# Patient Record
Sex: FEMALE | Race: WHITE | NOT HISPANIC OR LATINO | Employment: FULL TIME | URBAN - METROPOLITAN AREA
[De-identification: names, ages, dates, MRNs, and addresses within clinical notes are randomized per-mention and may not be internally consistent; named-entity substitution may affect disease eponyms.]

---

## 2017-02-14 ENCOUNTER — OFFICE VISIT - HEALTHEAST (OUTPATIENT)
Dept: FAMILY MEDICINE | Facility: CLINIC | Age: 42
End: 2017-02-14

## 2017-02-14 DIAGNOSIS — E66.3 OVERWEIGHT: ICD-10-CM

## 2017-02-14 ASSESSMENT — MIFFLIN-ST. JEOR: SCORE: 1251.66

## 2017-04-28 ENCOUNTER — OFFICE VISIT - HEALTHEAST (OUTPATIENT)
Dept: FAMILY MEDICINE | Facility: CLINIC | Age: 42
End: 2017-04-28

## 2017-04-28 DIAGNOSIS — E66.3 OVERWEIGHT: ICD-10-CM

## 2017-04-28 ASSESSMENT — MIFFLIN-ST. JEOR: SCORE: 1267.09

## 2017-06-27 ENCOUNTER — TRANSFERRED RECORDS (OUTPATIENT)
Dept: HEALTH INFORMATION MANAGEMENT | Facility: CLINIC | Age: 42
End: 2017-06-27

## 2017-06-30 ENCOUNTER — TRANSFERRED RECORDS (OUTPATIENT)
Dept: HEALTH INFORMATION MANAGEMENT | Facility: CLINIC | Age: 42
End: 2017-06-30

## 2017-07-06 ENCOUNTER — COMMUNICATION - HEALTHEAST (OUTPATIENT)
Dept: FAMILY MEDICINE | Facility: CLINIC | Age: 42
End: 2017-07-06

## 2017-07-06 ENCOUNTER — RECORDS - HEALTHEAST (OUTPATIENT)
Dept: ADMINISTRATIVE | Facility: OTHER | Age: 42
End: 2017-07-06

## 2017-07-06 DIAGNOSIS — E66.3 OVERWEIGHT: ICD-10-CM

## 2017-07-18 ENCOUNTER — HOSPITAL ENCOUNTER (OUTPATIENT)
Dept: NUCLEAR MEDICINE | Facility: HOSPITAL | Age: 42
Discharge: HOME | End: 2017-07-18
Attending: OTOLARYNGOLOGY

## 2017-07-18 ENCOUNTER — TRANSFERRED RECORDS (OUTPATIENT)
Dept: HEALTH INFORMATION MANAGEMENT | Facility: CLINIC | Age: 42
End: 2017-07-18

## 2017-07-18 DIAGNOSIS — H70.11 CHRONIC MASTOIDITIS, RIGHT EAR: ICD-10-CM

## 2017-08-07 ENCOUNTER — OFFICE VISIT - HEALTHEAST (OUTPATIENT)
Dept: FAMILY MEDICINE | Facility: CLINIC | Age: 42
End: 2017-08-07

## 2017-08-07 DIAGNOSIS — E66.3 OVERWEIGHT: ICD-10-CM

## 2017-08-07 DIAGNOSIS — K59.00 CONSTIPATION, UNSPECIFIED CONSTIPATION TYPE: ICD-10-CM

## 2017-08-07 ASSESSMENT — MIFFLIN-ST. JEOR: SCORE: 1265.27

## 2017-08-14 ENCOUNTER — PRE VISIT (OUTPATIENT)
Dept: OTOLARYNGOLOGY | Facility: CLINIC | Age: 42
End: 2017-08-14

## 2017-08-14 NOTE — TELEPHONE ENCOUNTER
1.  Date/reason for appt: 8/28/17 at 9 AM - Cholesteatoma   2.  Referring provider: [facility] Dr. Simone Torres  3.  Call to patient (Yes / No - short description): no, referred  4.  Previous care at:   [facility]

## 2017-08-14 NOTE — TELEPHONE ENCOUNTER
Records received from [facility], forwarded to clinic.   Office notes:  DOS 9/21/16, 1/17/17, 5/26/17, 6/27/17 with Dr. Simone Torres  DOS 6/27/17 with Aditya Claros  Radiology reports:  DOS 7/18/17 NM Bone Scan ([facility]) - img requested  Op/Procedure notes:  DOS 9/21/16 Op Note Right radical tympanomastoidectomy w/ ossicular chain reconstruction (Dr. Simone Torres) - at [facility]  Pathology reports:  DOS 6/27/17 Aerobic Bacterial Culture results  Other:   DOS 6/27/17 Audio    Missing/needed records:  Dr Donald records and op note from 2014

## 2017-08-16 NOTE — TELEPHONE ENCOUNTER
Addt'l records received from [facility], forwarded to clinic.   Office notes:  DOS 12/13/13 through 12/17/15 with Dr. Donald  DOS 1/19/16 through 11/29/16 with Dr. Simone Torres  DOS 4/25/14 & 10/2/14 (Audiulogy evals)  Radiology reports:  DOS 8/2/16 CT Temporal Bone @ [facility] (image requested)  Op/Procedure notes:  DOS 1/28/14 Right tympanomastoidectomy w/ ossicular reconstruction (Dr. Donald at [facility])  DOS 12/13/13 Myringotomy / aspiration / eustachian tube inflation (found within office note)  Pathology reports:  DOS 1/28/14 Right Ear ([facility] [phone])  Other:   DOS 1/19/16 Ear Culture  DOS 12/13/13, 4/23/14, 10/2/14, 6/30/16, 6/27/17

## 2017-08-18 DIAGNOSIS — H71.90 CHOLESTEATOMA: Primary | ICD-10-CM

## 2017-08-22 NOTE — TELEPHONE ENCOUNTER
Called and spoke with Maya at [facility]. Per Maya, imaging CD will be mailed tomorrow. Says their CT department took longer to interoffice a copy of the CD back to her.

## 2017-08-24 NOTE — TELEPHONE ENCOUNTER
Imaging CD (8/2/16 CT T Bone) received from [facility], dropped off CD with [facility] (cd is not diacom compliant therefore unable to upload to pacs but can view in regular computer).

## 2017-08-27 ENCOUNTER — HEALTH MAINTENANCE LETTER (OUTPATIENT)
Age: 42
End: 2017-08-27

## 2017-08-28 ENCOUNTER — OFFICE VISIT (OUTPATIENT)
Dept: OTOLARYNGOLOGY | Facility: CLINIC | Age: 42
End: 2017-08-28

## 2017-08-28 ENCOUNTER — OFFICE VISIT (OUTPATIENT)
Dept: AUDIOLOGY | Facility: CLINIC | Age: 42
End: 2017-08-28

## 2017-08-28 VITALS — HEIGHT: 63 IN | BODY MASS INDEX: 24.63 KG/M2 | WEIGHT: 139 LBS

## 2017-08-28 DIAGNOSIS — H74.8X9: Primary | ICD-10-CM

## 2017-08-28 DIAGNOSIS — H71.90 CHOLESTEATOMA: Primary | ICD-10-CM

## 2017-08-28 DIAGNOSIS — H90.71 MIXED CONDUCTIVE AND SENSORINEURAL HEARING LOSS OF RIGHT EAR WITH UNRESTRICTED HEARING OF LEFT EAR: ICD-10-CM

## 2017-08-28 RX ORDER — POLYETHYLENE GLYCOL 3350 17 G/17G
1 POWDER, FOR SOLUTION ORAL DAILY
COMMUNITY
End: 2021-10-28

## 2017-08-28 ASSESSMENT — PAIN SCALES - GENERAL: PAINLEVEL: NO PAIN (0)

## 2017-08-28 NOTE — PROGRESS NOTES
AUDIOLOGY REPORT    SUMMARY: Audiology visit completed. See audiogram for results.      RECOMMENDATIONS: Follow-up with ENT.    Berta Whiteside  Licensed Audiologist  [place] License #[id number]

## 2017-08-28 NOTE — MR AVS SNAPSHOT
After Visit Summary   8/28/2017    Aicha Batista    MRN: [id number]           Patient Information     Date Of Birth          1975        Visit Information        Provider Department      8/28/2017 9:00 AM Ana Gonsalez MD M [facility] Ear Nose and Throat        Today's Diagnoses     Otorrhea    -  1      Care Instructions    You will need  to schedule a follow up appointment in 3 weeks for an ear check  You will be called with the results of the ear culture.     Patient to call the ENT clinic with further questions or concerns:   ENT Triage Nurse  [phone] option 3  ENT appointment scheduling [phone] option 1  ENT surgery scheduling, [place] [phone]  ENT Nurse Korin [phone]   Please call our clinic for any questions,concerns,or worsening symptoms.      Clinic #[phone]       Option 3  for the triage nurse.          Follow-ups after your visit        Your next 10 appointments already scheduled     Sep 20, 2017  7:00 AM CDT   (Arrive by 6:45 AM)   RETURN NEUROTOLOGY with MD JAIMSON Trejo [facility] Ear Nose and Throat ([facility] [place])    [address] [place] [id number] [phone]              Who to contact     Please call your clinic at [phone] to:    Ask questions about your health    Make or cancel appointments    Discuss your medicines    Learn about your test results    Speak to your doctor   If you have compliments or concerns about an experience at your clinic, or if you wish to file a complaint, please contact [facility] Patient Relations at [phone] or email us at [email or web address]         Additional Information About Your Visit        MyChart Information     [employer] gives you secure access to your electronic health record. If you see a primary care provider, you can also send messages to your care team and make appointments. If you have questions, please call your  "primary care clinic.  If you do not have a primary care provider, please call [phone] and they will assist you.      [employer] is an electronic gateway that provides easy, online access to your medical records. With [employer], you can request a clinic appointment, read your test results, renew a prescription or communicate with your care team.     To access your existing account, please contact your [facility] or call [phone] for assistance.        Care EveryWhere ID     This is your Care EveryWhere ID. This could be used by other organizations to access your [facility] medical records  [id number]        Your Vitals Were     Height BMI (Body Mass Index)                1.59 m (5' 2.6\") 24.94 kg/m2           Blood Pressure from Last 3 Encounters:   07/23/07 110/78   12/06/05 100/62    Weight from Last 3 Encounters:   08/28/17 63 kg (139 lb)   07/23/07 64.4 kg (142 lb)   12/06/05 62.2 kg (137 lb 3.2 oz)              We Performed the Following     Ear Culture Aerobic Bacterial     Fungus Culture, non-blood          Today's Medication Changes          These changes are accurate as of: 8/28/17 10:44 AM.  If you have any questions, ask your nurse or doctor.               Stop taking these medicines if you haven't already. Please contact your care team if you have questions.     NO ACTIVE MEDICATIONS   Stopped by:  Ana Gonsalez MD                    Primary Care Provider    Physician No Ref-Primary       No address on file        Equal Access to Services     FIDE [facility] CenterTIP : Hadii med marcelinoo Sojoeali, waaxda luqadaha, qaybta kaalmada adeegyada, анна rubio . So [facility] [phone].    ATENCIÓN: Si habla español, tiene a hernandes disposición servicios gratuitos de asistencia lingüística. Llame al [phone].    We comply with applicable federal civil rights laws and [place] laws. We do not discriminate on the basis of race, color, national origin, age, " disability sex, sexual orientation or gender identity.            Thank you!     Thank you for choosing [facility] EAR NOSE AND THROAT  for your care. Our goal is always to provide you with excellent care. Hearing back from our patients is one way we can continue to improve our services. Please take a few minutes to complete the written survey that you may receive in the mail after your visit with us. Thank you!             Your Updated Medication List - Protect others around you: Learn how to safely use, store and throw away your medicines at www.disposemymeds.org.          This list is accurate as of: 8/28/17 10:44 AM.  Always use your most recent med list.                   Brand Name Dispense Instructions for use Diagnosis    PHENTRIDE PO      Take 15 mg by mouth        polyethylene glycol Packet    MIRALAX/GLYCOLAX     Take 1 packet by mouth daily

## 2017-08-28 NOTE — PATIENT INSTRUCTIONS
You will need  to schedule a follow up appointment in 3 weeks for an ear check  You will be called with the results of the ear culture.     Patient to call the ENT clinic with further questions or concerns:   ENT Triage Nurse  [phone] option 3  ENT appointment scheduling [phone] option 1  ENT surgery scheduling, [place] [phone]  ENT Nurse Korin [phone]   Please call our clinic for any questions,concerns,or worsening symptoms.      Clinic #[phone]       Option 3  for the triage nurse.

## 2017-08-28 NOTE — LETTER
"8/28/2017       RE: Aicha Batista  [address] [facility] [address] [address]     Dear Colleague,    Thank you for referring your patient, Aicha Batista, to the [facility] EAR NOSE AND THROAT at [facility]. Please see a copy of my visit note below.    Aicha Batista is seen in consultation from Dr. Simone Torres.  She is a 41 year old female being seen for recurrent right otorrhea.  Patient reports that she first went to an ENT for pulsatile tinnitus and pressure when they found a cholesteatoma. Her first surgery was in 2014 and was a canal wall down, and while it cured her pulsatile tinnitus, she has been draining \"ever since.\" Reports she had a second surgery (radical mastoid), and has been draining after that too. She has tried drops, boric acid, gentian violet, vinegar rinses, fungal drops. She reports that she drains everyday, and it is usually straw colored output on her pillow in the am. She also has crusting on her ear and pooling in the conchal bowl. She also believes that her hearing loss on the left side is progressing. She denies otalgia or recurrent ear infections as a kid.        Past Medical History:   Diagnosis Date     Conductive hearing loss 1/28/2014    after 1st ear surgery (think its conductive??)       Past Surgical History:   Procedure Laterality Date     ENT SURGERY  1/28/2014    2nd surgery Sept 2016     HC COLP VAGINA W CERVIX IF PRES  1992    Cauterization of the cervix.       Family History   Problem Relation Age of Onset     Breast Cancer Mother      Thyroid Disease Mother      HEART DISEASE Father      Heart failure     Hypertension Father      CEREBROVASCULAR DISEASE Father      Cancer - colorectal Maternal Grandmother      Eye Disorder Maternal Grandmother      Glucoma     DIABETES Maternal Grandfather      HEART DISEASE Paternal Grandfather      Heart failure       Social History   Substance Use Topics     Smoking status: Former Smoker     Packs/day: 0.50 "     Years: 14.00     Types: Cigarettes     Quit date: 10/20/2007     Smokeless tobacco: Never Used      Comment: Only one to two per day.     Alcohol use Yes      Comment: occasional       Patient Supplied Answers to Review of Systems  [facility] ROS 8/24/2017   Ears, Nose, Throat Hearing loss, Ringing/noise in ears   The remainder of the 10 point review of systems is otherwise negative.    Physical examination:  Constitutional:  In no acute distress, appears stated age  Eyes:  Extraocular movements intact, no spontaneous nystagmus  Ears:  Both ears examined under the microscope.  The right ear shows a small mastoid cavity. There is granulation tissue and purulent drainage covering the TM and floor of the canal. The TM is thickened. The drainage is cultured. Left ear has normal, healthy EAC and TM. Sun lateralizes right. Rinne on right is bone>air, and left shows air>bone.  Respiratory:  No increased work of breathing, wheezing or stridor  Musculoskeletal:  Good upper extremity strength  Skin:  No rashes on the head and neck  Neurologic:  House Brackman 1/6 bilaterally, ambulating normally  Psychiatric:  Alert, normal affect, answering questions appropriately    Audiogram:  Today's audiogram shows normal left-sided hearing. The right side has moderate rising to normal, down sloping to mild hearing loss, 30-50dB air-bone gaps present at most frequencies. Her left tymp is normal, right is not (as would be expected)    Assessment and plan:  This is a 40 yo female with otorrhea for years after a CWD mastoidectomy for cholesteatoma. Since she has been draining for this long, we will give 6 weeks of oral antibiotics pending the cultures. She has had a bone scan already, and that was negative for osteomyelitis, however she may still need IV antibiotics to clear the infection. Although canal overclosure would fix her otorrhea, her hearing is quite good so we will see how she does on a prolonged course of antibiotics. We will  call her with the results of the culture and have her follow up in 3 weeks for recheck.     I, Ana Gonsalez, saw this patient with the resident/fellow and agree with the resident s findings and plan of care as documented in the resident s/fellow s note. I was present for the entire procedure.    Ana Gonsalez MD

## 2017-08-28 NOTE — PROGRESS NOTES
"Aicha Batista is seen in consultation from Dr. Simone Torres.  She is a 41 year old female being seen for recurrent right otorrhea.  Patient reports that she first went to an ENT for pulsatile tinnitus and pressure when they found a cholesteatoma. Her first surgery was in 2014 and was a canal wall down, and while it cured her pulsatile tinnitus, she has been draining \"ever since.\" Reports she had a second surgery (radical mastoid), and has been draining after that too. She has tried drops, boric acid, gentian violet, vinegar rinses, fungal drops. She reports that she drains everyday, and it is usually straw colored output on her pillow in the am. She also has crusting on her ear and pooling in the conchal bowl. She also believes that her hearing loss on the left side is progressing. She denies otalgia or recurrent ear infections as a kid.        Past Medical History:   Diagnosis Date     Conductive hearing loss 1/28/2014    after 1st ear surgery (think its conductive??)       Past Surgical History:   Procedure Laterality Date     ENT SURGERY  1/28/2014    2nd surgery Sept 2016     HC COLP VAGINA W CERVIX IF PRES  1992    Cauterization of the cervix.       Family History   Problem Relation Age of Onset     Breast Cancer Mother      Thyroid Disease Mother      HEART DISEASE Father      Heart failure     Hypertension Father      CEREBROVASCULAR DISEASE Father      Cancer - colorectal Maternal Grandmother      Eye Disorder Maternal Grandmother      Glucoma     DIABETES Maternal Grandfather      HEART DISEASE Paternal Grandfather      Heart failure       Social History   Substance Use Topics     Smoking status: Former Smoker     Packs/day: 0.50     Years: 14.00     Types: Cigarettes     Quit date: 10/20/2007     Smokeless tobacco: Never Used      Comment: Only one to two per day.     Alcohol use Yes      Comment: occasional       Patient Supplied Answers to Review of Systems   ENT ROS 8/24/2017   Ears, Nose, Throat " Hearing loss, Ringing/noise in ears   The remainder of the 10 point review of systems is otherwise negative.    Physical examination:  Constitutional:  In no acute distress, appears stated age  Eyes:  Extraocular movements intact, no spontaneous nystagmus  Ears:  Both ears examined under the microscope.  The right ear shows a small mastoid cavity. There is granulation tissue and purulent drainage covering the TM and floor of the canal. The TM is thickened. The drainage is cultured. Left ear has normal, healthy EAC and TM. Sun lateralizes right. Rinne on right is bone>air, and left shows air>bone.  Respiratory:  No increased work of breathing, wheezing or stridor  Musculoskeletal:  Good upper extremity strength  Skin:  No rashes on the head and neck  Neurologic:  House Brackman 1/6 bilaterally, ambulating normally  Psychiatric:  Alert, normal affect, answering questions appropriately    Audiogram:  Today's audiogram shows normal left-sided hearing. The right side has moderate rising to normal, down sloping to mild hearing loss, 30-50dB air-bone gaps present at most frequencies. Her left tymp is normal, right is not (as would be expected)    Assessment and plan:  This is a 42 yo female with otorrhea for years after a CWD mastoidectomy for cholesteatoma. Since she has been draining for this long, we will give 6 weeks of oral antibiotics pending the cultures. She has had a bone scan already, and that was negative for osteomyelitis, however she may still need IV antibiotics to clear the infection. Although canal overclosure would fix her otorrhea, her hearing is quite good so we will see how she does on a prolonged course of antibiotics. We will call her with the results of the culture and have her follow up in 3 weeks for recheck.     IAna, saw this patient with the resident/fellow and agree with the resident s findings and plan of care as documented in the resident s/fellow s note. I was present for the  entire procedure.    Ana Gonsalez MD

## 2017-09-01 DIAGNOSIS — H92.10 OTORRHEA: Primary | ICD-10-CM

## 2017-09-01 LAB
BACTERIA SPEC CULT: ABNORMAL
BACTERIA SPEC CULT: ABNORMAL
SPECIMEN SOURCE: ABNORMAL

## 2017-09-01 RX ORDER — CIPROFLOXACIN 500 MG/1
500 TABLET, FILM COATED ORAL 2 TIMES DAILY
Qty: 84 TABLET | Refills: 0 | Status: SHIPPED | OUTPATIENT
Start: 2017-09-01 | End: 2017-10-13

## 2017-09-01 RX ORDER — OFLOXACIN 3 MG/ML
5 SOLUTION AURICULAR (OTIC) 2 TIMES DAILY
Qty: 15 ML | Refills: 1 | Status: SHIPPED | OUTPATIENT
Start: 2017-09-01 | End: 2017-10-13

## 2017-09-01 NOTE — NURSING NOTE
Hi.  She's got 2 strains of pseudomonas.  Oral cipro 500 bid x 6 weeks total as well as Floxin for that long.  I'd like to see her in a month.  Thanks.     Ana     Above message left on pt voice mail. Rx sent to pharmacy. Pt has RTC scheduled.  Korin Mayes RN  ENT Care Coordinator   Otology  [phone]  9/1/2017 10:56 AM

## 2017-09-20 ENCOUNTER — OFFICE VISIT (OUTPATIENT)
Dept: OTOLARYNGOLOGY | Facility: CLINIC | Age: 42
End: 2017-09-20

## 2017-09-20 DIAGNOSIS — H70.91 INFECTION OF MASTOID BOWL, RIGHT: Primary | ICD-10-CM

## 2017-09-20 ASSESSMENT — PAIN SCALES - GENERAL: PAINLEVEL: NO PAIN (0)

## 2017-09-20 NOTE — PROGRESS NOTES
Aicha Batista is seen for a right ear check.  She grew 2 strains of pseudomonas on culture and she is currently on oral ciprofloxacin as well as topical ofloxacin.  She says that are days that the ear seems pretty dry and others where the ear is still moist.  It doesn't drain out of the ear but she can feel moisture in her ear and it squishes when she presses on the ear.  No pain.    Physical examination:  female in no acute distress.  Alert and answering questions appropriately.  HB 1/6 bilaterally.  Right ear examined under the microscope.  Posterior cavity still with an area of granulation filling the posterior cavity.  The anterior cavity and TM itself is healthy with only one small area of mucosalization.  The granulation was debrided with a curette and there is a firm fibrous band under the granulation with 2 perforations within the band and the posterior mastoid cavity is underneath the band.  She tolerated this fairly well.  The granulation was also cultured.    Assessment and plan:  Improved appearance of the mastoid cavity but still with granulation posteriorly, the TM is much healthier in appearance.  She still has several weeks of antibiotics.  I'd like to see her again in 2 weeks for another debridement.  If this culture continues to grow pseudomonas, we discussed that next time I will try to remove the scar band to open up the mastoid cavity posteriorly to open up the area of likely infection.  She is open to this as she'd like to avoid having another surgery if possible.

## 2017-09-20 NOTE — LETTER
9/20/2017      RE: [facility]ista  [address] [facility] [address]       Aicha Batista is seen for a right ear check.  She grew 2 strains of pseudomonas on culture and she is currently on oral ciprofloxacin as well as topical ofloxacin.  She says that are days that the ear seems pretty dry and others where the ear is still moist.  It doesn't drain out of the ear but she can feel moisture in her ear and it squishes when she presses on the ear.  No pain.    Physical examination:  female in no acute distress.  Alert and answering questions appropriately.  HB 1/6 bilaterally.  Right ear examined under the microscope.  Posterior cavity still with an area of granulation filling the posterior cavity.  The anterior cavity and TM itself is healthy with only one small area of mucosalization.  The granulation was debrided with a curette and there is a firm fibrous band under the granulation with 2 perforations within the band and the posterior mastoid cavity is underneath the band.  She tolerated this fairly well.  The granulation was also cultured.    Assessment and plan:  Improved appearance of the mastoid cavity but still with granulation posteriorly, the TM is much healthier in appearance.  She still has several weeks of antibiotics.  I'd like to see her again in 2 weeks for another debridement.  If this culture continues to grow pseudomonas, we discussed that next time I will try to remove the scar band to open up the mastoid cavity posteriorly to open up the area of likely infection.  She is open to this as she'd like to avoid having another surgery if possible.      Ana Gonsalez MD

## 2017-09-20 NOTE — PATIENT INSTRUCTIONS
You will need  to schedule a follow up appointment in 2 weeks.  You will be called with the results of the ear culture.   Please call our clinic for any questions,concerns,or worsening symptoms.      Clinic #[phone]       Option 3  for the triage nurse.

## 2017-09-20 NOTE — MR AVS SNAPSHOT
After Visit Summary   9/20/2017    Aicha Batista    MRN: [id number]           Patient Information     Date Of Birth          1975        Visit Information        Provider Department      9/20/2017 7:00 AM Ana Gonsalez MD M [facility] Ear Nose and Throat        Today's Diagnoses     Infection of mastoid bowl, right    -  1      Care Instructions    You will need  to schedule a follow up appointment in 2 weeks.  You will be called with the results of the ear culture.   Please call our clinic for any questions,concerns,or worsening symptoms.      Clinic #[phone]       Option 3  for the triage nurse.          Follow-ups after your visit        Follow-up notes from your care team     Return in about 2 weeks (around 10/4/2017).      Your next 10 appointments already scheduled     Oct 09, 2017  1:30 PM CDT   Walk In From ENT with Maria Luisa Henson [facility] Audiology ([facility])    [address] [place] [address] [phone]            Oct 09, 2017  2:45 PM CDT   (Arrive by 2:30 PM)   RETURN NEUROTOLOGY with MD JAMISON Trejo [facility] Ear Nose and Throat ([facility])    [address] [place] [address] [phone]              Who to contact     Please call your clinic at [phone] to:    Ask questions about your health    Make or cancel appointments    Discuss your medicines    Learn about your test results    Speak to your doctor   If you have compliments or concerns about an experience at your clinic, or if you wish to file a complaint, please contact [facility] Physicians Patient Relations at [phone] or email us at [email or web address]         Additional Information About Your Visit        MyChart Information     [employer] gives you secure access to your electronic health record. If you see a primary care provider, you can also send messages to your  care team and make appointments. If you have questions, please call your primary care clinic.  If you do not have a primary care provider, please call [phone] and they will assist you.      [employer] is an electronic gateway that provides easy, online access to your medical records. With [employer], you can request a clinic appointment, read your test results, renew a prescription or communicate with your care team.     To access your existing account, please contact your [facility] or call [phone] for assistance.        Care EveryWhere ID     This is your Care EveryWhere ID. This could be used by other organizations to access your [place] medical records  [id number]         Blood Pressure from Last 3 Encounters:   07/23/07 110/78   12/06/05 100/62    Weight from Last 3 Encounters:   08/28/17 63 kg (139 lb)   07/23/07 64.4 kg (142 lb)   12/06/05 62.2 kg (137 lb 3.2 oz)              We Performed the Following     BINOCULAR MICROSCOPY     DEBRIDMENT MASTOID CAVITY, SIMPLE     Ear Culture Aerobic Bacterial     Fungus Culture, non-blood        Primary Care Provider    Physician No Ref-Primary       No address on file        Equal Access to Services     AUGUSTO ELLINGTON : Hadii med Thurman, wagriffinda jackie, qabecky yeboah, анна perera. So [facility] [phone].    ATENCIÓN: Si habla español, tiene a hernandes disposición servicios gratGallup Indian Medical Centeros de asistencia lingüística. Llame al [phone].    We comply with applicable federal civil rights laws and [place] laws. We do not discriminate on the basis of race, color, national origin, age, disability sex, sexual orientation or gender identity.            Thank you!     Thank you for choosing [facility] EAR NOSE AND THROAT  for your care. Our goal is always to provide you with excellent care. Hearing back from our patients is one way we can continue to improve our services. Please take a few minutes to  complete the written survey that you may receive in the mail after your visit with us. Thank you!             Your Updated Medication List - Protect others around you: Learn how to safely use, store and throw away your medicines at www.disposemymeds.org.          This list is accurate as of: 9/20/17  8:17 AM.  Always use your most recent med list.                   Brand Name Dispense Instructions for use Diagnosis    ciprofloxacin 500 MG tablet    CIPRO    84 tablet    Take 1 tablet (500 mg) by mouth 2 times daily    Otorrhea       ofloxacin 0.3 % otic solution    FLOXIN    15 mL    Place 5 drops into the right ear 2 times daily    Otorrhea       PHENTRIDE PO      Take 15 mg by mouth        polyethylene glycol Packet    MIRALAX/GLYCOLAX     Take 1 packet by mouth daily

## 2017-09-22 ENCOUNTER — OFFICE VISIT - HEALTHEAST (OUTPATIENT)
Dept: FAMILY MEDICINE | Facility: CLINIC | Age: 42
End: 2017-09-22

## 2017-09-22 DIAGNOSIS — E66.3 OVERWEIGHT: ICD-10-CM

## 2017-09-22 ASSESSMENT — MIFFLIN-ST. JEOR: SCORE: 1262.1

## 2017-09-24 LAB
BACTERIA SPEC CULT: ABNORMAL
BACTERIA SPEC CULT: ABNORMAL
SPECIMEN SOURCE: ABNORMAL

## 2017-09-25 ENCOUNTER — CARE COORDINATION (OUTPATIENT)
Dept: OTOLARYNGOLOGY | Facility: CLINIC | Age: 42
End: 2017-09-25

## 2017-09-25 LAB
FUNGUS SPEC CULT: NORMAL
SPECIMEN SOURCE: NORMAL

## 2017-09-25 NOTE — PROGRESS NOTES
Aicha Batista   None    Message  Received: Today       Ana Gonsalez MD Bergh, Korin CARRINGTON, RN                   Still with 2 strains of pseudomonas.  Would you continue her antibiotics until her next appointment?  I'm going to open up the scar band if she'll tolerate it in clinic so I'll need belluci scissors.  Thanks.     Ana   9-25-17 Above message left on pt voice mail- pt has enough antibiotics until appt.  --- Korin Mayes RN  ENT Care Coordinator   Otology  [phone]  9/25/2017 11:35 AM

## 2017-10-09 ENCOUNTER — OFFICE VISIT (OUTPATIENT)
Dept: OTOLARYNGOLOGY | Facility: CLINIC | Age: 42
End: 2017-10-09

## 2017-10-09 DIAGNOSIS — H91.90 HEARING LOSS: Primary | ICD-10-CM

## 2017-10-09 DIAGNOSIS — H70.91 INFECTION OF RIGHT MASTOID BOWL: Primary | ICD-10-CM

## 2017-10-09 ASSESSMENT — PAIN SCALES - GENERAL: PAINLEVEL: NO PAIN (0)

## 2017-10-09 NOTE — LETTER
10/9/2017      RE: Aicha Batista  [address] [facility] [address]       Neurotology Clinic- Return visit  10/09/17    Aicha Batista is seen for a right ear check. She was last seen 9/20/17 given ongoing right otorrhea. A culture was taken and grew 2 strains of pseudomonas and continued regimen of ciprofloxacin BID. She states her ear has improved significantly and hasn't been draining. She denies otalgia, changes in her hearing, dizziness, imbalance, facial weakness.     Physical examination:  female in no acute distress.  Alert and answering questions appropriately.  HB 1/6 bilaterally.  Right ear examined under the microscope.  Posterior cavity appears slightly improved without significant otorrhea but persistent granulation tissue. A culture swab sample was collected. A curette was used to debride the granulation tissue within the posterior mastoid bowl down to the fibrous tissue layer medially. Purulent material was seen under this granulation tissue. The rest of her right ear looks improved with no further mucosalization on the TM.    Assessment and plan:  Ms. Batista shows mild improvement in her right mastoid cavity but shows persistent granulation tissue in the same posterior area. Her last culture showed some resistance in one of the pseudomonas strain. We debrided the granulation tissue partially today. Given persistent ear infection, we decided to have our Infectious Diseases colleagues see her given concern for developing resistance. She agreed with the plan.     - Referral to ID  - Return to clinic in 2 weeks for monitoring and debridement.   - Continue ear drops. Follow up culture results, adjust regimen as needed.  - We recommend that she contact us sooner if there are any questions or concerns.     Katlin Collins MD  PGY2  OtoHNS    I, Ana Gonsalez, saw this patient with the resident/fellow and agree with the resident s findings and plan of care as documented in the  resident s/fellow s note. I was present for the entire procedure.    Ana Gonsalez MD

## 2017-10-09 NOTE — PROGRESS NOTES
Neurotology Clinic- Return visit  10/09/17    Aicha Batista is seen for a right ear check. She was last seen 9/20/17 given ongoing right otorrhea. A culture was taken and grew 2 strains of pseudomonas and continued regimen of ciprofloxacin BID. She states her ear has improved significantly and hasn't been draining. She denies otalgia, changes in her hearing, dizziness, imbalance, facial weakness.     Physical examination:  female in no acute distress.  Alert and answering questions appropriately.  HB 1/6 bilaterally.  Right ear examined under the microscope.  Posterior cavity appears slightly improved without significant otorrhea but persistent granulation tissue. A culture swab sample was collected. A curette was used to debride the granulation tissue within the posterior mastoid bowl down to the fibrous tissue layer medially. Purulent material was seen under this granulation tissue. The rest of her right ear looks improved with no further mucosalization on the TM.    Assessment and plan:  Ms. Batista shows mild improvement in her right mastoid cavity but shows persistent granulation tissue in the same posterior area. Her last culture showed some resistance in one of the pseudomonas strain. We debrided the granulation tissue partially today. Given persistent ear infection, we decided to have our Infectious Diseases colleagues see her given concern for developing resistance. She agreed with the plan.     - Referral to ID  - Return to clinic in 2 weeks for monitoring and debridement.   - Continue ear drops. Follow up culture results, adjust regimen as needed.  - We recommend that she contact us sooner if there are any questions or concerns.     Katlin Collins MD  PGY2  OtoHNS    I, Ana Gonsalez, saw this patient with the resident/fellow and agree with the resident s findings and plan of care as documented in the resident s/fellow s note. I was present for the entire procedure.    Ana Gonsalez MD

## 2017-10-09 NOTE — PATIENT INSTRUCTIONS
You will need  to schedule a follow up appointment in   You will have a consult with Infectious disease physician  You will be called with the results of the ear culture.   Please call our clinic for any questions,concerns,or worsening symptoms.      Clinic #[phone]       Option 3  for the triage nurse.

## 2017-10-09 NOTE — MR AVS SNAPSHOT
After Visit Summary   10/9/2017    Aicha Batista    MRN: [id number]           Patient Information     Date Of Birth          1975        Visit Information        Provider Department      10/9/2017 2:45 PM Ana Gonsalez MD M [facility] Ear Nose and Throat        Today's Diagnoses     Otorrhea, right    -  1      Care Instructions    You will need  to schedule a follow up appointment in   You will have a consult with Infectious disease physician  You will be called with the results of the ear culture.   Please call our clinic for any questions,concerns,or worsening symptoms.      Clinic #[phone]       Option 3  for the triage nurse.          Follow-ups after your visit        Additional Services     INFECTIOUS DISEASE REFERRAL       Your provider has referred you to: [facility]: [facility] (Infectious Disease and HIV Clinic) [facility] [phone]   [email or web address]    EVALUATE AND TREAT- RIGHT OTORRHEA    Please be aware that coverage of these services is subject to the terms and limitations of your health insurance plan.  Call member services at your health plan with any benefit or coverage questions.      Please bring the following with you to your appointment:    (1) Any X-Rays, CTs or MRIs which have been performed.  Contact the facility where they were done to arrange for  prior to your scheduled appointment.    (2) List of current medications   (3) This referral request   (4) Any documents/labs given to you for this referral                  Your next 10 appointments already scheduled     Oct 23, 2017  8:30 AM CDT   (Arrive by 8:15 AM)   RETURN NEUROTOLOGY with MD JAMISON Trejo [facility] Ear Nose and Throat ([facility])    [address] [place] [address]   [phone]            Nov 03, 2017  1:00 PM CDT   (Arrive by 12:45 PM)   New Patient Visit with Angela Blake  MD [place]   [facility] and Infectious Diseases ([facility])    [address]  3rd [address]  [facility] [address] [phone]              Future tests that were ordered for you today     Open Future Orders        Priority Expected Expires Ordered    AUDIOLOGY ADULT REFERRAL Routine  4/7/2018 10/9/2017            Who to contact     Please call your clinic at [phone] to:    Ask questions about your health    Make or cancel appointments    Discuss your medicines    Learn about your test results    Speak to your doctor   If you have compliments or concerns about an experience at your clinic, or if you wish to file a complaint, please contact [facility] Physicians Patient Relations at [phone] or email us at [email or web address]         Additional Information About Your Visit        indicohart Information     [employer] gives you secure access to your electronic health record. If you see a primary care provider, you can also send messages to your care team and make appointments. If you have questions, please call your primary care clinic.  If you do not have a primary care provider, please call [phone] and they will assist you.      [employer] is an electronic gateway that provides easy, online access to your medical records. With [employer], you can request a clinic appointment, read your test results, renew a prescription or communicate with your care team.     To access your existing account, please contact your [facility] Clinic or call [phone] for assistance.        Care EveryWhere ID     This is your Care EveryWhere ID. This could be used by other organizations to access your [place] medical records  [id number]         Blood Pressure from Last 3 Encounters:   07/23/07 110/78   12/06/05 100/62    Weight from Last 3 Encounters:   08/28/17 63 kg (139 lb)   07/23/07 64.4 kg (142 lb)   12/06/05 62.2 kg (137  lb 3.2 oz)              We Performed the Following     Ear Culture Aerobic Bacterial     Fungus Culture, non-blood     INFECTIOUS DISEASE REFERRAL        Primary Care Provider Office Phone # Fax #    Jg Reynoso [phone]       [facility] [address]  [facility] [address]        Equal Access to Services     AUGUSTO ELLINGTON : Hadii aad ku hadasho Soomaali, waaxda luqadaha, qaybta kaalmada adeegyada, waxay idiin hayaan adeeg khkikrsh la'tristinn ah. So [facility] [phone].    ATENCIÓN: Si habla español, tiene a hernandes disposición servicios gratuitos de asistencia lingüística. Llame al [phone].    We comply with applicable federal civil rights laws and [place] laws. We do not discriminate on the basis of race, color, national origin, age, disability, sex, sexual orientation, or gender identity.            Thank you!     Thank you for choosing [facility] EAR NOSE AND THROAT  for your care. Our goal is always to provide you with excellent care. Hearing back from our patients is one way we can continue to improve our services. Please take a few minutes to complete the written survey that you may receive in the mail after your visit with us. Thank you!             Your Updated Medication List - Protect others around you: Learn how to safely use, store and throw away your medicines at www.disposemymeds.org.          This list is accurate as of: 10/9/17  3:09 PM.  Always use your most recent med list.                   Brand Name Dispense Instructions for use Diagnosis    ciprofloxacin 500 MG tablet    CIPRO    84 tablet    Take 1 tablet (500 mg) by mouth 2 times daily    Otorrhea       ofloxacin 0.3 % otic solution    FLOXIN    15 mL    Place 5 drops into the right ear 2 times daily    Otorrhea       PHENTRIDE PO      Take 15 mg by mouth        polyethylene glycol Packet    MIRALAX/GLYCOLAX     Take 1 packet by mouth daily

## 2017-10-09 NOTE — NURSING NOTE
Chief Complaint   Patient presents with     RECHECK     2 week follow up     Rasheed Pollock LPN

## 2017-10-09 NOTE — Clinical Note
10/9/2017       RE: Aicha Batista  [address] [facility] [address]     Dear Colleague,    Thank you for referring your patient, Aicha Batista, to the [facility] EAR NOSE AND TH[facility]AT at [facility]. Please see a copy of my visit note below.    Neurotology Clinic- Return visit  10/09/17    Aicha Batista is seen for a right ear check. She was last seen 9/20/17 given ongoing right otorrhea. A culture was taken and grew 2 strains of pseudomonas and continued regimen of ciprofloxacin BID. She states her ear has improved significantly and hasn't been draining. She denies otalgia, changes in her hearing, dizziness, imbalance, facial weakness.       Physical examination:  female in no acute distress.  Alert and answering questions appropriately.  HB 1/6 bilaterally.  Right ear examined under the microscope.  Posterior cavity appears slightly  improved without significant swelling but persistent pink granulation tissue with moist covering. A culture swab sample was collected. A curette was used to debride the granulation tissue sitting along the posterior mastoid bowl. Purulent material was seen under this granulation tissue. The rest of her right ear looks improved.     Assessment and plan:  Ms. Batista shows mild improvement in her right mastoid cavity but shows persistent granulation tissue in the same posterior area. Her last culture showed some resistance in one of the pseudomonas strain. We debrided the granulation tissue partially today. Given persistent ear infection, we decided to have our Infectious Diseases colleagues see her given concern for developing resistance. She agreed with the plan.     - Referral to ID  - Return to clinic in 2 weeks for monitoring and debridement.   - Continue ear drops. Follow up culture results, adjust regimen as needed.  - We recommend that she contact us sooner if there are any questions or concerns.     Katlin Collins MD  PGY2   OtoHNS        Again, thank you for allowing me to participate in the care of your patient.      Sincerely,    Ana Gonsalez MD

## 2017-10-11 LAB
BACTERIA SPEC CULT: NO GROWTH
SPECIMEN SOURCE: NORMAL

## 2017-10-13 DIAGNOSIS — H92.11 OTORRHEA, RIGHT: Primary | ICD-10-CM

## 2017-10-13 RX ORDER — CIPROFLOXACIN AND DEXAMETHASONE 3; 1 MG/ML; MG/ML
SUSPENSION/ DROPS AURICULAR (OTIC)
Qty: 7.5 ML | Refills: 0 | Status: SHIPPED | OUTPATIENT
Start: 2017-10-13 | End: 2017-10-23

## 2017-10-13 NOTE — NURSING NOTE
Dr. Gonsalez has reviewed the ear culture done 10-9-17, no growth. New Rx sent to pharmacy- due to clinical exam- ciprodex ear drops until rtc, keep the ID appt on 11-3-17 for now.  Above message left on pt voice mail--   Korin Mayes RN  ENT Care Coordinator   Otology  [phone]  10/13/2017 8:00 AM

## 2017-10-19 LAB
FUNGUS SPEC CULT: NORMAL
SPECIMEN SOURCE: NORMAL

## 2017-10-20 ENCOUNTER — TELEPHONE (OUTPATIENT)
Dept: INFECTIOUS DISEASES | Facility: CLINIC | Age: 42
End: 2017-10-20

## 2017-10-20 ENCOUNTER — MYC MEDICAL ADVICE (OUTPATIENT)
Dept: HEMATOLOGY | Facility: CLINIC | Age: 42
End: 2017-10-20

## 2017-10-20 NOTE — TELEPHONE ENCOUNTER
I called Aicha and left a message to inquire whether she could re-schedule her 11/3 appt from 1pm to either 9am or 10am. I instructed her to call back to let us know.    Angela Youngblood MD   of Medicine, Division of Infectious Diseases  [facility] [phone]

## 2017-10-23 ENCOUNTER — OFFICE VISIT (OUTPATIENT)
Dept: OTOLARYNGOLOGY | Facility: CLINIC | Age: 42
End: 2017-10-23

## 2017-10-23 DIAGNOSIS — H70.91 INFECTION OF RIGHT MASTOID BOWL: Primary | ICD-10-CM

## 2017-10-23 RX ORDER — CIPROFLOXACIN 500 MG/1
500 TABLET, FILM COATED ORAL 2 TIMES DAILY
Qty: 42 TABLET | Refills: 0 | Status: SHIPPED | OUTPATIENT
Start: 2017-10-23 | End: 2017-11-13

## 2017-10-23 ASSESSMENT — PAIN SCALES - GENERAL: PAINLEVEL: NO PAIN (0)

## 2017-10-23 NOTE — LETTER
10/23/2017      RE: [facility] Batista  [address] [facility] [facility] [address]       Aicha Batista is seen for a right mastoid cavity check.  Her last culture suprisingly was negative.  She reports her ear continues to be dry and feels fine.  She remains on drops but has been off her oral antibiotics for about a week.    Physical examination:  female in no acute distress.  Alert and answering questions appropriately.  HB 1/6 bilaterally.  Right ear examined under the microscope.  The posterior mastoid cavity has a very thin granulation lining that is no longer granular in appearance, there is a small amount of purulent in the superior posterior cavity and this area was cultured.  The thin layer of granulation was again removed from the surface of the cavity and suctioned away.  The anterior cavity and TM are healthy in appearance.    Assessment and plan:  Improving but still with a layer of granulation on the posterior cavity.  We will call with the culture results and I've put her back on ciprofloxacin orally.  She has an ID appointment next week but if the culture remains negative, we'll cancel that and continue to treat her with oral and topical antibiotics.      Ana Gonsalez MD

## 2017-10-23 NOTE — PROGRESS NOTES
Aicha Batista is seen for a right mastoid cavity check.  Her last culture suprisingly was negative.  She reports her ear continues to be dry and feels fine.  She remains on drops but has been off her oral antibiotics for about a week.    Physical examination:  female in no acute distress.  Alert and answering questions appropriately.  HB 1/6 bilaterally.  Right ear examined under the microscope.  The posterior mastoid cavity has a very thin granulation lining that is no longer granular in appearance, there is a small amount of purulent in the superior posterior cavity and this area was cultured.  The thin layer of granulation was again removed from the surface of the cavity and suctioned away.  The anterior cavity and TM are healthy in appearance.    Assessment and plan:  Improving but still with a layer of granulation on the posterior cavity.  We will call with the culture results and I've put her back on ciprofloxacin orally.  She has an ID appointment next week but if the culture remains negative, we'll cancel that and continue to treat her with oral and topical antibiotics.

## 2017-10-23 NOTE — PATIENT INSTRUCTIONS
You will need  to schedule a follow up appointment in 2 weeks.  You will be called with the results of the ear culture.  Please  the prescription at the pharmacy.   Please call our clinic for any questions,concerns,or worsening symptoms.      Clinic #[phone]       Option 3  for the triage nurse.

## 2017-10-25 ENCOUNTER — CARE COORDINATION (OUTPATIENT)
Dept: OTOLARYNGOLOGY | Facility: CLINIC | Age: 42
End: 2017-10-25

## 2017-10-25 NOTE — PROGRESS NOTES
Aicha Batista       Message  Received: Today       Ana Gonsalez MD Bergh, Korin CARRINGTON, RN                   Yep, as suspected, still growing pseudomonas.  Definitely see ID.  Thanks.     Ana   10-25-17 above message left on pt voice mail. ID appt in 11-3-17.    Korin Mayes RN  ENT Care Coordinator   Otology  [phone]  10/25/2017 3:42 PM

## 2017-10-26 ASSESSMENT — ENCOUNTER SYMPTOMS
TROUBLE SWALLOWING: 0
ABDOMINAL PAIN: 1
HEARTBURN: 0
VOMITING: 0
MUSCLE CRAMPS: 1
RECTAL BLEEDING: 0
RECTAL PAIN: 0
NAUSEA: 0
SMELL DISTURBANCE: 0
NECK MASS: 0
SINUS PAIN: 0
BACK PAIN: 1
STIFFNESS: 1
HOARSE VOICE: 0
SORE THROAT: 0
BLOOD IN STOOL: 0
MYALGIAS: 1
CONSTIPATION: 1
DIARRHEA: 0
BOWEL INCONTINENCE: 0
JAUNDICE: 0
SINUS CONGESTION: 0
TASTE DISTURBANCE: 0
ARTHRALGIAS: 1
NECK PAIN: 1
MUSCLE WEAKNESS: 0
JOINT SWELLING: 0
BLOATING: 1

## 2017-10-27 LAB
BACTERIA SPEC CULT: ABNORMAL
SPECIMEN SOURCE: ABNORMAL

## 2017-11-03 ENCOUNTER — OFFICE VISIT (OUTPATIENT)
Dept: INFECTIOUS DISEASES | Facility: CLINIC | Age: 42
End: 2017-11-03
Attending: INTERNAL MEDICINE
Payer: COMMERCIAL

## 2017-11-03 VITALS
DIASTOLIC BLOOD PRESSURE: 70 MMHG | RESPIRATION RATE: 18 BRPM | TEMPERATURE: 97.5 F | HEIGHT: 63 IN | HEART RATE: 70 BPM | BODY MASS INDEX: 25.55 KG/M2 | SYSTOLIC BLOOD PRESSURE: 122 MMHG | WEIGHT: 144.2 LBS

## 2017-11-03 DIAGNOSIS — H71.91 CHOLESTEATOMA OF RIGHT EAR: Primary | ICD-10-CM

## 2017-11-03 DIAGNOSIS — H70.91 INFECTION OF RIGHT MASTOID BOWL: ICD-10-CM

## 2017-11-03 PROCEDURE — 99212 OFFICE O/P EST SF 10 MIN: CPT | Mod: ZF

## 2017-11-03 ASSESSMENT — PAIN SCALES - GENERAL: PAINLEVEL: NO PAIN (0)

## 2017-11-03 NOTE — LETTER
11/3/2017       RE: Aich[facility] Batista  [facility] [facility] [address]     Dear Colleague,    Thank you for referring your patient, Aicha Batista, to the [facility] AND INFECTIOUS DISEASES at [facility]. Please see a copy of my visit note below.    ID Initial Patient Visit Note    Assessment:  -Chronic ear drainage likely related to cholesteatoma. I suspect she had an element of superficial Pseudomonas infection superimposed on this, but with frequent debridement and systemic + topical therapy this has resolved for now. I do suspect she may be at some risk for recurrent infection due to her altered anatomy.    Plan:  -For now I would stop oral cipro, since the Pseudomonas cultured most recently is resistant and she does not have a clinical indication for therapy with resolved drainage and no imaging to suggest bony infection. It seems reasonable to continue topical cipro, and if future endoscopic evaluations reveal no drainage could stop this agent, too, in the future.    If drainage recurs, she will likely need additional endoscopic debridement with cultures, though I would implicate Pseudomonas for future infections. At that point (ie, in the event of recurrent drainage) I would begin topical tobra-dex pending cultures; if symptoms are refractory to topical tobra-dex would trial a parenteral antibiotic.    She should follow-up with ID as needed.    Visit length 45 min, >50% clinical counseling/care coordination.    Angela Youngblood MD   of Medicine, Division of Infectious Diseases  [facility] [place] [phone]      HPI:  This is a pleasant 42 y/o woman presenting for chronic ear drainage. She initially reported pulsatile tinnitus in 2014 and underwent R CWD mastoidectomy with findings of opacified air cells and abnormal middle-ear soft tissue. Her tinnitus stopped but she had ear drainage a year after her surgical procedure prompting an evaluation that  ultimately rendered a diagnosis of recurrent cholesteatoma in 9/2016. When the drainage persisted despite non-operative interventions she underwent bone scan in 7/2017 that revealed no osteomyelitis. She was ultimately referred to [facility]. She has undergone serial endoscopies, with cultures growing P aeruginosa on 8/28 and 9/20. She notably received a prolonged (~4-6 weeks) course of oral cipro and cipro-dex drops. By her 10/9 endoscopy procedure, drainage had largely ceased and cultures were negative, though on 10/23 while active drainage was absence ear cultures grew P aeruginosa with the following susceptibilities:  PSEUDOMONAS AERUGINOSA      Antibiotic Interpretation Sensitivity Unit Method Status     AMIKACIN Sensitive <=16.0 ug/mL JOSÉ Final     CEFEPIME Sensitive <=2.0 ug/mL JOSÉ Final     CEFTAZIDIME Sensitive 2.0 ug/mL JOSÉ Final     CIPROFLOXACIN Resistant >2.0 ug/mL JOSÉ Final     GENTAMICIN Sensitive <=1.0 ug/mL JOSÉ Final     LEVOFLOXACIN Resistant >4.0 ug/mL JOSÉ Final     MEROPENEM Sensitive <=1.0 ug/mL JOSÉ Final     Piperacillin/Tazo Sensitive <=4.0 ug/mL JOSÉ Final     TOBRAMYCIN Sensitive <=1.0 ug/mL JOSÉ Final     She was placed back on ciprofloxacin orally. She reports the drainage has remained essentially quiet. No change in her hearing over the past few weeks. She continues to use cipro-dex drops.    Past Medical History:   Diagnosis Date     Conductive hearing loss 1/28/2014    after 1st ear surgery (think its conductive??)     Past Surgical History:   Procedure Laterality Date     ENT SURGERY  1/28/2014    2nd surgery Sept 2016     HC COLP VAGINA W CERVIX IF PRES  1992    Cauterization of the cervix.       Current Outpatient Prescriptions on File Prior to Visit:  ciprofloxacin (CIPRO) 500 MG tablet Take 1 tablet (500 mg) by mouth 2 times daily for 21 days   polyethylene glycol (MIRALAX/GLYCOLAX) Packet Take 1 packet by mouth daily   Phentermine HCl (PHENTRIDE PO) Take 15 mg by mouth     No current  "facility-administered medications on file prior to visit.     Family History   Problem Relation Age of Onset     Breast Cancer Mother      Thyroid Disease Mother      HEART DISEASE Father      Heart failure     Hypertension Father      CEREBROVASCULAR DISEASE Father      Cancer - colorectal Maternal Grandmother      Eye Disorder Maternal Grandmother      Glucoma     DIABETES Maternal Grandfather      HEART DISEASE Paternal Grandfather      Heart failure     Social History   Substance Use Topics     Smoking status: Former Smoker     Packs/day: 0.50     Years: 14.00     Types: Cigarettes     Quit date: 10/20/2007     Smokeless tobacco: Never Used      Comment: Only one to two per day.     Alcohol use Yes      Comment: occasional     Exam:  /70  Pulse 70  Temp 97.5  F (36.4  C) (Oral)  Resp 18  Ht 1.59 m (5' 2.6\")  Wt 65.4 kg (144 lb 3.2 oz)  BMI 25.87 kg/m2  Awake, alert, pleasant  Examination of R ear reveals a scarred TM, no drainage that I can appreciate. No chris-auricular tenderness. L ear normal  HR regular  Breathing normal, lungs clear  No skin rash    Sincerely,    Angela Youngblood MD  "

## 2017-11-03 NOTE — PATIENT INSTRUCTIONS
It was nice to meet you today!    Here are the things we discussed:  -I would advise stopping oral ciprofloxacin, but continuing cipro-dex (drops)  -When Dr. Gonsalez sees that your ear looks normal or almost normal, you could try stopping the cipro-dex drops  -If the drainage comes back, I would advocate trying tobra-dex drops, and if drainage continues despite tobra-dex drops I would advocate trying an IV antibiotic. This would require that you return to a clinic setting to have an IV line put in that you could have at home and manage with a nurse's help.

## 2017-11-03 NOTE — MR AVS SNAPSHOT
After Visit Summary   11/3/2017    Aicha Batista    MRN: [id number]           Patient Information     Date Of Birth          1975        Visit Information        Provider Department      11/3/2017 9:00 AM Angela Youngblood MD [facility] and Infectious Diseases        Today's Diagnoses     Infection of right mastoid bowl          Care Instructions    It was nice to meet you today!    Here are the things we discussed:  -I would advise stopping oral ciprofloxacin, but continuing cipro-dex (drops)  -When Dr. Gonsalez sees that your ear looks normal or almost normal, you could try stopping the cipro-dex drops  -If the drainage comes back, I would advocate trying tobra-dex drops, and if drainage continues despite tobra-dex drops I would advocate trying an IV antibiotic. This would require that you return to a clinic setting to have an IV line put in that you could have at home and manage with a nurse's help.          Follow-ups after your visit        Follow-up notes from your care team     Return if symptoms worsen or fail to improve.      Your next 10 appointments already scheduled     Nov 09, 2017  2:45 PM CST   (Arrive by 2:30 PM)   RETURN NEUROTOLOGY with Ana Gonsalez MD   [facility] Ear Nose and Throat ([facility])    [address] [place] [address]  [facility] [address] [phone]              Who to contact     If you have questions or need follow up information about today's clinic visit or your schedule please contact [facility] AND INFECTIOUS DISEASES directly at [phone].  Normal or non-critical lab and imaging results will be communicated to you by MyChart, letter or phone within 4 business days after the clinic has received the results. If you do not hear from us within 7 days, please contact the clinic through MyChart or phone. If you have a critical or abnormal lab result, we will notify you by phone as soon as  "possible.  Submit refill requests through [employer] or call your pharmacy and they will forward the refill request to us. Please allow 3 business days for your refill to be completed.          Additional Information About Your Visit        [employer] Information     [employer] gives you secure access to your electronic health record. If you see a primary care provider, you can also send messages to your care team and make appointments. If you have questions, please call your primary care clinic.  If you do not have a primary care provider, please call [phone] and they will assist you.        Care EveryWhere ID     This is your Care EveryWhere ID. This could be used by other organizations to access your [place] medical records  [id number]        Your Vitals Were     Pulse Temperature Respirations Height BMI (Body Mass Index)       70 97.5  F (36.4  C) (Oral) 18 1.59 m (5' 2.6\") 25.87 kg/m2        Blood Pressure from Last 3 Encounters:   11/03/17 122/70   07/23/07 110/78   12/06/05 100/62    Weight from Last 3 Encounters:   11/03/17 65.4 kg (144 lb 3.2 oz)   08/28/17 63 kg (139 lb)   07/23/07 64.4 kg (142 lb)              Today, you had the following     No orders found for display       Primary Care Provider Office Phone # Fax #    Jg Reynoso [phone]       [facility] [address]  [facility] [address]        Equal Access to Services     [facility]: Hadii med ku hadasho Sojoeali, waaxda luqadaha, qaybta kaalmada adecanyada, анна perera. So [facility] [phone].    ATENCIÓN: Si habla español, tiene a hernandes disposición servicios gratuitos de asistencia lingüística. Llame al [phone].    We comply with applicable federal civil rights laws and [place] laws. We do not discriminate on the basis of race, color, national origin, age, disability, sex, sexual orientation, or gender identity.            Thank you!     Thank you for choosing [facility] " [facility] AND INFECTIOUS DISEASES  for your care. Our goal is always to provide you with excellent care. Hearing back from our patients is one way we can continue to improve our services. Please take a few minutes to complete the written survey that you may receive in the mail after your visit with us. Thank you!             Your Updated Medication List - Protect others around you: Learn how to safely use, store and throw away your medicines at www.disposemymeds.org.          This list is accurate as of: 11/3/17 10:00 AM.  Always use your most recent med list.                   Brand Name Dispense Instructions for use Diagnosis    ciprofloxacin 500 MG tablet    CIPRO    42 tablet    Take 1 tablet (500 mg) by mouth 2 times daily for 21 days    Infection of right mastoid bowl       PHENTRIDE PO      Take 15 mg by mouth        polyethylene glycol Packet    MIRALAX/GLYCOLAX     Take 1 packet by mouth daily

## 2017-11-03 NOTE — NURSING NOTE
"Chief Complaint   Patient presents with     Consult     New otorrhea consult       Initial /70  Pulse 70  Temp 97.5  F (36.4  C) (Oral)  Resp 18  Ht 1.59 m (5' 2.6\")  Wt 65.4 kg (144 lb 3.2 oz)  BMI 25.87 kg/m2 Estimated body mass index is 25.87 kg/(m^2) as calculated from the following:    Height as of this encounter: 1.59 m (5' 2.6\").    Weight as of this encounter: 65.4 kg (144 lb 3.2 oz).  Medication Reconciliation: complete   BROOKLYN AKERS CMA      "

## 2017-11-03 NOTE — LETTER
11/3/2017      RE: Aicha Batista  [address] [facility] [address]       ID Initial Patient Visit Note    Assessment:  -Chronic ear drainage likely related to cholesteatoma. I suspect she had an element of superficial Pseudomonas infection superimposed on this, but with frequent debridement and systemic + topical therapy this has resolved for now. I do suspect she may be at some risk for recurrent infection due to her altered anatomy.    Plan:  -For now I would stop oral cipro, since the Pseudomonas cultured most recently is resistant and she does not have a clinical indication for therapy with resolved drainage and no imaging to suggest bony infection. It seems reasonable to continue topical cipro, and if future endoscopic evaluations reveal no drainage could stop this agent, too, in the future.    If drainage recurs, she will likely need additional endoscopic debridement with cultures, though I would implicate Pseudomonas for future infections. At that point (ie, in the event of recurrent drainage) I would begin topical tobra-dex pending cultures; if symptoms are refractory to topical tobra-dex would trial a parenteral antibiotic.    She should follow-up with ID as needed.    Visit length 45 min, >50% clinical counseling/care coordination.    Angela Youngblood MD   of Medicine, Division of Infectious Diseases  [facility] [phone]      HPI:  This is a pleasant 40 y/o woman presenting for chronic ear drainage. She initially reported pulsatile tinnitus in 2014 and underwent R CWD mastoidectomy with findings of opacified air cells and abnormal middle-ear soft tissue. Her tinnitus stopped but she had ear drainage a year after her surgical procedure prompting an evaluation that ultimately rendered a diagnosis of recurrent cholesteatoma in 9/2016. When the drainage persisted despite non-operative interventions she underwent bone scan in 7/2017 that revealed no osteomyelitis. She was ultimately referred  to [facility]. She has undergone serial endoscopies, with cultures growing P aeruginosa on 8/28 and 9/20. She notably received a prolonged (~4-6 weeks) course of oral cipro and cipro-dex drops. By her 10/9 endoscopy procedure, drainage had largely ceased and cultures were negative, though on 10/23 while active drainage was absence ear cultures grew P aeruginosa with the following susceptibilities:  PSEUDOMONAS AERUGINOSA      Antibiotic Interpretation Sensitivity Unit Method Status     AMIKACIN Sensitive <=16.0 ug/mL JOSÉ Final     CEFEPIME Sensitive <=2.0 ug/mL JOSÉ Final     CEFTAZIDIME Sensitive 2.0 ug/mL JOSÉ Final     CIPROFLOXACIN Resistant >2.0 ug/mL JOSÉ Final     GENTAMICIN Sensitive <=1.0 ug/mL JOSÉ Final     LEVOFLOXACIN Resistant >4.0 ug/mL JOSÉ Final     MEROPENEM Sensitive <=1.0 ug/mL JOSÉ Final     Piperacillin/Tazo Sensitive <=4.0 ug/mL JOSÉ Final     TOBRAMYCIN Sensitive <=1.0 ug/mL JOSÉ Final     She was placed back on ciprofloxacin orally. She reports the drainage has remained essentially quiet. No change in her hearing over the past few weeks. She continues to use cipro-dex drops.    Past Medical History:   Diagnosis Date     Conductive hearing loss 1/28/2014    after 1st ear surgery (think its conductive??)     Past Surgical History:   Procedure Laterality Date     ENT SURGERY  1/28/2014    2nd surgery Sept 2016     HC COLP VAGINA W CERVIX IF PRES  1992    Cauterization of the cervix.       Current Outpatient Prescriptions on File Prior to Visit:  ciprofloxacin (CIPRO) 500 MG tablet Take 1 tablet (500 mg) by mouth 2 times daily for 21 days   polyethylene glycol (MIRALAX/GLYCOLAX) Packet Take 1 packet by mouth daily   Phentermine HCl (PHENTRIDE PO) Take 15 mg by mouth     No current facility-administered medications on file prior to visit.     Family History   Problem Relation Age of Onset     Breast Cancer Mother      Thyroid Disease Mother      HEART DISEASE Father      Heart failure     Hypertension  "Father      CEREBROVASCULAR DISEASE Father      Cancer - colorectal Maternal Grandmother      Eye Disorder Maternal Grandmother      Glucoma     DIABETES Maternal Grandfather      HEART DISEASE Paternal Grandfather      Heart failure     Social History   Substance Use Topics     Smoking status: Former Smoker     Packs/day: 0.50     Years: 14.00     Types: Cigarettes     Quit date: 10/20/2007     Smokeless tobacco: Never Used      Comment: Only one to two per day.     Alcohol use Yes      Comment: occasional     Exam:  /70  Pulse 70  Temp 97.5  F (36.4  C) (Oral)  Resp 18  Ht 1.59 m (5' 2.6\")  Wt 65.4 kg (144 lb 3.2 oz)  BMI 25.87 kg/m2  Awake, alert, pleasant  Examination of R ear reveals a scarred TM, no drainage that I can appreciate. No chris-auricular tenderness. L ear normal  HR regular  Breathing normal, lungs clear  No skin rash      Angela Youngblood MD      "

## 2017-11-06 ENCOUNTER — COMMUNICATION - HEALTHEAST (OUTPATIENT)
Dept: FAMILY MEDICINE | Facility: CLINIC | Age: 42
End: 2017-11-06

## 2017-11-06 DIAGNOSIS — E66.3 OVERWEIGHT: ICD-10-CM

## 2017-11-06 LAB
FUNGUS SPEC CULT: NORMAL
SPECIMEN SOURCE: NORMAL

## 2017-11-09 ENCOUNTER — OFFICE VISIT (OUTPATIENT)
Dept: OTOLARYNGOLOGY | Facility: CLINIC | Age: 42
End: 2017-11-09

## 2017-11-09 VITALS — HEIGHT: 63 IN | BODY MASS INDEX: 25.52 KG/M2 | WEIGHT: 144 LBS

## 2017-11-09 DIAGNOSIS — H70.91 INFECTION OF RIGHT MASTOID BOWL: Primary | ICD-10-CM

## 2017-11-09 RX ORDER — CIPROFLOXACIN AND DEXAMETHASONE 3; 1 MG/ML; MG/ML
SUSPENSION/ DROPS AURICULAR (OTIC)
Qty: 7.5 ML | Refills: 0 | Status: SHIPPED | OUTPATIENT
Start: 2017-11-09 | End: 2017-11-19

## 2017-11-09 ASSESSMENT — PAIN SCALES - GENERAL: PAINLEVEL: NO PAIN (0)

## 2017-11-09 NOTE — LETTER
11/9/2017      RE: Aicha Batista  [address] [facility] [address]       Aicha Batista is seen for a right mastoid check.  She has not had any further otorrhea or pain.  She saw ID who recommended she stop oral ciprofloxacin but continue the Ciprodex drops.    Physical examination:  female in no acute distress.  Alert and answering questions appropriately.  HB 1/6 bilaterally.  Right ear examined under the microscope.  The TM and anterior portion of the cavity is healthy and stable.  The posterior portion of the cavity has no granulation tissue seen, no mucosalization, the floor is dry with neovascularization on the floor.    Assessment and plan:  This is the healthiest her cavity has been so far.  At this point, I recommended she stop the drops and continue dry ear precautions.  Hopefully her ear will remain healthy with no antibiotic therapy.  She will be seen in 2 weeks for another check.      Ana Gonsalez MD

## 2017-11-09 NOTE — PATIENT INSTRUCTIONS
You will need  to schedule a follow up appointment in 2 weeks.  Please  the prescription for ear drops.   Please call our clinic for any questions,concerns,or worsening symptoms.      Clinic #[phone]       Option 3  for the triage nurse.

## 2017-11-09 NOTE — MR AVS SNAPSHOT
After Visit Summary   11/9/2017    Aicha Batista    MRN: [id number]           Patient Information     Date Of Birth          1975        Visit Information        Provider Department      11/9/2017 2:45 PM Ana Gonsalez MD [facility] Ear Nose and Throat        Care Instructions    You will need  to schedule a follow up appointment in 2 weeks.  Please  the prescription for ear drops.   Please call our clinic for any questions,concerns,or worsening symptoms.      Clinic #[phone]       Option 3  for the triage nurse.          Follow-ups after your visit        Your next 10 appointments already scheduled     Nov 30, 2017  2:45 PM CST   (Arrive by 2:30 PM)   RETURN NEUROTOLOGY with MD JAMISON Trejo [facility] Ear Nose and Throat ([facility])    [address] [place] [address] [phone]              Who to contact     Please call your clinic at [phone] to:    Ask questions about your health    Make or cancel appointments    Discuss your medicines    Learn about your test results    Speak to your doctor   If you have compliments or concerns about an experience at your clinic, or if you wish to file a complaint, please contact [facility] Patient Relations at [phone] or email us at [email or web address]         Additional Information About Your Visit        MyChart Information     [employer] gives you secure access to your electronic health record. If you see a primary care provider, you can also send messages to your care team and make appointments. If you have questions, please call your primary care clinic.  If you do not have a primary care provider, please call [phone] and they will assist you.      [employer] is an electronic gateway that provides easy, online access to your medical records. With [employer], you can request a clinic appointment, read your test results, renew a prescription  "or communicate with your care team.     To access your existing account, please contact your [facility] or call [phone] for assistance.        Care EveryWhere ID     This is your Care EveryWhere ID. This could be used by other organizations to access your [place] medical records  [id number]        Your Vitals Were     Height BMI (Body Mass Index)                1.59 m (5' 2.6\") 25.84 kg/m2           Blood Pressure from Last 3 Encounters:   11/03/17 122/70   07/23/07 110/78   12/06/05 100/62    Weight from Last 3 Encounters:   11/09/17 65.3 kg (144 lb)   11/03/17 65.4 kg (144 lb 3.2 oz)   08/28/17 63 kg (139 lb)              Today, you had the following     No orders found for display       Primary Care Provider Office Phone # Fax #    Jg Reynoso [phone]       [facility] [address]  [facility] [address]        Equal Access to Services     [place]GUSTO ELLINGTON : Hadii aad ku hadasho Soomaali, waaxda luqadaha, qaybta kaalmada adeegyada, waxay idiin hayaan akiko rubio . So [facility] [phone].    ATENCIÓN: Si habla español, tiene a hernandes disposición servicios gratuitos de asistencia lingüística. Llame al [phone].    We comply with applicable federal civil rights laws and [place] laws. We do not discriminate on the basis of race, color, national origin, age, disability, sex, sexual orientation, or gender identity.            Thank you!     Thank you for choosing [facility] EAR NOSE AND THROAT  for your care. Our goal is always to provide you with excellent care. Hearing back from our patients is one way we can continue to improve our services. Please take a few minutes to complete the written survey that you may receive in the mail after your visit with us. Thank you!             Your Updated Medication List - Protect others around you: Learn how to safely use, store and throw away your medicines at www.disposemymeds.org.          This " list is accurate as of: 11/9/17  3:29 PM.  Always use your most recent med list.                   Brand Name Dispense Instructions for use Diagnosis    ciprofloxacin 500 MG tablet    CIPRO    42 tablet    Take 1 tablet (500 mg) by mouth 2 times daily for 21 days    Infection of right mastoid bowl       PHENTRIDE PO      Take 15 mg by mouth        polyethylene glycol Packet    MIRALAX/GLYCOLAX     Take 1 packet by mouth daily

## 2017-11-09 NOTE — NURSING NOTE
Chief Complaint   Patient presents with     RECHECK     2 week f/u     Faye Murguia Medical Assistant

## 2017-11-09 NOTE — Clinical Note
11/9/2017       RE: [facility]ich[facility] Batista  [address] [facility] [address]     Dear Colleague,    Thank you for referring your patient, Aicha Batista, to the [facility] EAR NOSE AND THROAT at [facility]. Please see a copy of my visit note below.    No notes on file    Again, thank you for allowing me to participate in the care of your patient.      Sincerely,    nAa Gonsalez MD

## 2017-11-13 NOTE — PROGRESS NOTES
Aicha Batista is seen for a right mastoid check.  She has not had any further otorrhea or pain.  She saw ID who recommended she stop oral ciprofloxacin but continue the Ciprodex drops.    Physical examination:  female in no acute distress.  Alert and answering questions appropriately.  HB 1/6 bilaterally.  Right ear examined under the microscope.  The TM and anterior portion of the cavity is healthy and stable.  The posterior portion of the cavity has no granulation tissue seen, no mucosalization, the floor is dry with neovascularization on the floor.    Assessment and plan:  This is the healthiest her cavity has been so far.  At this point, I recommended she stop the drops and continue dry ear precautions.  Hopefully her ear will remain healthy with no antibiotic therapy.  She will be seen in 2 weeks for another check.

## 2017-11-13 NOTE — PROGRESS NOTES
ID Initial Patient Visit Note    Assessment:  -Chronic ear drainage likely related to cholesteatoma. I suspect she had an element of superficial Pseudomonas infection superimposed on this, but with frequent debridement and systemic + topical therapy this has resolved for now. I do suspect she may be at some risk for recurrent infection due to her altered anatomy.    Plan:  -For now I would stop oral cipro, since the Pseudomonas cultured most recently is resistant and she does not have a clinical indication for therapy with resolved drainage and no imaging to suggest bony infection. It seems reasonable to continue topical cipro, and if future endoscopic evaluations reveal no drainage could stop this agent, too, in the future.    If drainage recurs, she will likely need additional endoscopic debridement with cultures, though I would implicate Pseudomonas for future infections. At that point (ie, in the event of recurrent drainage) I would begin topical tobra-dex pending cultures; if symptoms are refractory to topical tobra-dex would trial a parenteral antibiotic.    She should follow-up with ID as needed.    Visit length 45 min, >50% clinical counseling/care coordination.    Angela Youngblood MD   of Medicine, Division of Infectious Diseases  [facility] [phone]      HPI:  This is a pleasant 40 y/o woman presenting for chronic ear drainage. She initially reported pulsatile tinnitus in 2014 and underwent R CWD mastoidectomy with findings of opacified air cells and abnormal middle-ear soft tissue. Her tinnitus stopped but she had ear drainage a year after her surgical procedure prompting an evaluation that ultimately rendered a diagnosis of recurrent cholesteatoma in 9/2016. When the drainage persisted despite non-operative interventions she underwent bone scan in 7/2017 that revealed no osteomyelitis. She was ultimately referred to [facility]. She has undergone serial endoscopies, with cultures growing P  aeruginosa on 8/28 and 9/20. She notably received a prolonged (~4-6 weeks) course of oral cipro and cipro-dex drops. By her 10/9 endoscopy procedure, drainage had largely ceased and cultures were negative, though on 10/23 while active drainage was absence ear cultures grew P aeruginosa with the following susceptibilities:  PSEUDOMONAS AERUGINOSA      Antibiotic Interpretation Sensitivity Unit Method Status     AMIKACIN Sensitive <=16.0 ug/mL JOSÉ Final     CEFEPIME Sensitive <=2.0 ug/mL JOSÉ Final     CEFTAZIDIME Sensitive 2.0 ug/mL JOSÉ Final     CIPROFLOXACIN Resistant >2.0 ug/mL JOSÉ Final     GENTAMICIN Sensitive <=1.0 ug/mL JOSÉ Final     LEVOFLOXACIN Resistant >4.0 ug/mL JOSÉ Final     MEROPENEM Sensitive <=1.0 ug/mL JOSÉ Final     Piperacillin/Tazo Sensitive <=4.0 ug/mL JOSÉ Final     TOBRAMYCIN Sensitive <=1.0 ug/mL JOSÉ Final     She was placed back on ciprofloxacin orally. She reports the drainage has remained essentially quiet. No change in her hearing over the past few weeks. She continues to use cipro-dex drops.    Past Medical History:   Diagnosis Date     Conductive hearing loss 1/28/2014    after 1st ear surgery (think its conductive??)     Past Surgical History:   Procedure Laterality Date     ENT SURGERY  1/28/2014    2nd surgery Sept 2016     HC COLP VAGINA W CERVIX IF PRES  1992    Cauterization of the cervix.       Current Outpatient Prescriptions on File Prior to Visit:  ciprofloxacin (CIPRO) 500 MG tablet Take 1 tablet (500 mg) by mouth 2 times daily for 21 days   polyethylene glycol (MIRALAX/GLYCOLAX) Packet Take 1 packet by mouth daily   Phentermine HCl (PHENTRIDE PO) Take 15 mg by mouth     No current facility-administered medications on file prior to visit.     Family History   Problem Relation Age of Onset     Breast Cancer Mother      Thyroid Disease Mother      HEART DISEASE Father      Heart failure     Hypertension Father      CEREBROVASCULAR DISEASE Father      Cancer - colorectal Maternal  "Grandmother      Eye Disorder Maternal Grandmother      Glucoma     DIABETES Maternal Grandfather      HEART DISEASE Paternal Grandfather      Heart failure     Social History   Substance Use Topics     Smoking status: Former Smoker     Packs/day: 0.50     Years: 14.00     Types: Cigarettes     Quit date: 10/20/2007     Smokeless tobacco: Never Used      Comment: Only one to two per day.     Alcohol use Yes      Comment: occasional     Exam:  /70  Pulse 70  Temp 97.5  F (36.4  C) (Oral)  Resp 18  Ht 1.59 m (5' 2.6\")  Wt 65.4 kg (144 lb 3.2 oz)  BMI 25.87 kg/m2  Awake, alert, pleasant  Examination of R ear reveals a scarred TM, no drainage that I can appreciate. No chris-auricular tenderness. L ear normal  HR regular  Breathing normal, lungs clear  No skin rash    "

## 2017-11-20 LAB
FUNGUS SPEC CULT: NORMAL
SPECIMEN SOURCE: NORMAL

## 2017-11-30 ENCOUNTER — OFFICE VISIT (OUTPATIENT)
Dept: OTOLARYNGOLOGY | Facility: CLINIC | Age: 42
End: 2017-11-30

## 2017-11-30 DIAGNOSIS — H90.11 CONDUCTIVE HEARING LOSS OF RIGHT EAR WITH UNRESTRICTED HEARING OF LEFT EAR: Primary | ICD-10-CM

## 2017-11-30 ASSESSMENT — PAIN SCALES - GENERAL: PAINLEVEL: NO PAIN (0)

## 2017-11-30 NOTE — NURSING NOTE
Chief Complaint   Patient presents with     RECHECK     Return Post op 2 wk Pt states no pain and feeling well.        TIMOTHY Adams LPN

## 2017-11-30 NOTE — MR AVS SNAPSHOT
After Visit Summary   11/30/2017    Aicha Batista    MRN: [id number]           Patient Information     Date Of Birth          1975        Visit Information        Provider Department      11/30/2017 2:45 PM Ana Gonsalez MD M [facility] Ear Nose and Throat        Care Instructions    You will need  to schedule a follow up appointment in 4 months for an ear check.   Please call our clinic for any questions,concerns,or worsening symptoms.      Clinic #[phone]       Option 3  for the triage nurse.          Follow-ups after your visit        Your next 10 appointments already scheduled     Mar 29, 2018  2:30 PM CDT   (Arrive by 2:15 PM)   RETURN NEUROTOLOGY with MD JAMISON Trejo [facility] Ear Nose and Throat ([facility])    [address] [address] [phone]              Who to contact     Please call your clinic at [phone] to:    Ask questions about your health    Make or cancel appointments    Discuss your medicines    Learn about your test results    Speak to your doctor   If you have compliments or concerns about an experience at your clinic, or if you wish to file a complaint, please contact [facility] Patient Relations at [phone] or email us at [email or web address]         Additional Information About Your Visit        MyChart Information     [employer] gives you secure access to your electronic health record. If you see a primary care provider, you can also send messages to your care team and make appointments. If you have questions, please call your primary care clinic.  If you do not have a primary care provider, please call [phone] and they will assist you.      [employer] is an electronic gateway that provides easy, online access to your medical records. With [employer], you can request a clinic appointment, read your test results, renew a prescription or communicate with your  care team.     To access your existing account, please contact your [facility] or call [phone] for assistance.        Care EveryWhere ID     This is your Care EveryWhere ID. This could be used by other organizations to access your [place] medical records  [id number]         Blood Pressure from Last 3 Encounters:   11/03/17 122/70   07/23/07 110/78   12/06/05 100/62    Weight from Last 3 Encounters:   11/09/17 65.3 kg (144 lb)   11/03/17 65.4 kg (144 lb 3.2 oz)   08/28/17 63 kg (139 lb)              Today, you had the following     No orders found for display       Primary Care Provider Office Phone # Fax #    Jg Reynoso [phone]       [facility] [address]  Merged with [facility] [address]        Equal Access to Services     AUGUSTO ELLINGTON : Hadii med ku hadasho Soomaali, waaxda luqadaha, qaybta kaalmada adeegyada, анна urbano hayjose angel rubio . So [facility] [phone].    ATENCIÓN: Si habla español, tiene a hernandes disposición servicios gratuitos de asistencia lingüística. Llame al [phone].    We comply with applicable federal civil rights laws and [place] laws. We do not discriminate on the basis of race, color, national origin, age, disability, sex, sexual orientation, or gender identity.            Thank you!     Thank you for choosing [facility] EAR NOSE AND THROAT  for your care. Our goal is always to provide you with excellent care. Hearing back from our patients is one way we can continue to improve our services. Please take a few minutes to complete the written survey that you may receive in the mail after your visit with us. Thank you!             Your Updated Medication List - Protect others around you: Learn how to safely use, store and throw away your medicines at www.disposemymeds.org.          This list is accurate as of: 11/30/17  3:29 PM.  Always use your most recent med list.                   Brand Name Dispense  Instructions for use Diagnosis    PHENTRIDE PO      Take 15 mg by mouth        polyethylene glycol Packet    MIRALAX/GLYCOLAX     Take 1 packet by mouth daily

## 2017-11-30 NOTE — PATIENT INSTRUCTIONS
You will need  to schedule a follow up appointment in 4 months for an ear check.   Please call our clinic for any questions,concerns,or worsening symptoms.      Clinic #[phone]       Option 3  for the triage nurse.

## 2017-11-30 NOTE — LETTER
11/30/2017      RE: [facility]  [address] [facility] [facility] [address]       Aicha Batista is seen for a right mastoid check.  She says the ear has been completely dry--no itching, no drainage, hearing stable.  She has been completely off drops and oral antibiotics for a few weeks now.    Physical examination:  female in no acute distress.  Alert and answering questions appropriately.  HB 1/6 bilaterally.  Right ear examined under the microscope.  TM intact and healthy, posterior cavity very healthy and nicely epithelialized.  It is now evident that she has a dehiscent sigmoid sinus in the posterior cavity with no bone covering the sigmoid, it is ballotable.    Assessment and plan:  Well healed mastoid with no evidence of infection.  She was very pleased.  We'll see her again in 4 months for a check.      Ana Gonsalez MD

## 2017-11-30 NOTE — PROGRESS NOTES
Aicha Batista is seen for a right mastoid check.  She says the ear has been completely dry--no itching, no drainage, hearing stable.  She has been completely off drops and oral antibiotics for a few weeks now.    Physical examination:  female in no acute distress.  Alert and answering questions appropriately.  HB 1/6 bilaterally.  Right ear examined under the microscope.  TM intact and healthy, posterior cavity very healthy and nicely epithelialized.  It is now evident that she has a dehiscent sigmoid sinus in the posterior cavity with no bone covering the sigmoid, it is ballotable.    Assessment and plan:  Well healed mastoid with no evidence of infection.  She was very pleased.  We'll see her again in 4 months for a check.

## 2018-01-08 ENCOUNTER — OFFICE VISIT - HEALTHEAST (OUTPATIENT)
Dept: FAMILY MEDICINE | Facility: CLINIC | Age: 43
End: 2018-01-08

## 2018-01-08 DIAGNOSIS — E66.3 OVERWEIGHT: ICD-10-CM

## 2018-01-08 ASSESSMENT — MIFFLIN-ST. JEOR: SCORE: 1268.45

## 2018-01-16 ENCOUNTER — COMMUNICATION - HEALTHEAST (OUTPATIENT)
Dept: FAMILY MEDICINE | Facility: CLINIC | Age: 43
End: 2018-01-16

## 2018-01-18 ENCOUNTER — RECORDS - HEALTHEAST (OUTPATIENT)
Dept: ADMINISTRATIVE | Facility: OTHER | Age: 43
End: 2018-01-18

## 2018-04-17 ENCOUNTER — AMBULATORY - HEALTHEAST (OUTPATIENT)
Dept: MULTI SPECIALTY CLINIC | Facility: CLINIC | Age: 43
End: 2018-04-17

## 2018-04-17 LAB
HBA1C MFR BLD: 4.4 % (ref 0–5.6)
PAP SMEAR - HIM PATIENT REPORTED: NORMAL

## 2018-04-18 ENCOUNTER — OFFICE VISIT (OUTPATIENT)
Dept: OTOLARYNGOLOGY | Facility: CLINIC | Age: 43
End: 2018-04-18
Payer: COMMERCIAL

## 2018-04-18 VITALS — HEIGHT: 64 IN | WEIGHT: 145 LBS | BODY MASS INDEX: 24.75 KG/M2

## 2018-04-18 DIAGNOSIS — H90.11 CONDUCTIVE HEARING LOSS OF RIGHT EAR WITH UNRESTRICTED HEARING OF LEFT EAR: Primary | ICD-10-CM

## 2018-04-18 PROBLEM — E66.3 OVERWEIGHT: Status: ACTIVE | Noted: 2018-04-18

## 2018-04-18 PROBLEM — M62.89 PELVIC FLOOR DYSFUNCTION: Status: ACTIVE | Noted: 2017-03-16

## 2018-04-18 PROBLEM — Z87.19 HISTORY OF CHRONIC CONSTIPATION: Status: ACTIVE | Noted: 2018-04-18

## 2018-04-18 PROBLEM — R92.30 DENSE BREAST TISSUE: Status: ACTIVE | Noted: 2018-04-17

## 2018-04-18 PROBLEM — K59.09 CHRONIC CONSTIPATION: Status: ACTIVE | Noted: 2017-03-16

## 2018-04-18 PROBLEM — Z80.3 FAMILY HISTORY OF BREAST CANCER IN MOTHER: Status: ACTIVE | Noted: 2018-04-17

## 2018-04-18 PROBLEM — K60.2 ANAL FISSURE: Status: ACTIVE | Noted: 2018-04-18

## 2018-04-18 PROBLEM — E55.9 VITAMIN D DEFICIENCY: Status: ACTIVE | Noted: 2018-04-18

## 2018-04-18 ASSESSMENT — PAIN SCALES - GENERAL: PAINLEVEL: NO PAIN (0)

## 2018-04-18 NOTE — LETTER
4/18/2018      RE: Aicha Batista  [address] [facility] [address]       Aicha Batista is seen for a right ear check.  She reports no drainage, pain or changes in hearing.    Physical examination:  female in no acute distress.  Alert and answering questions appropriately.  HB 1/6 bilaterally.  Right ear examined under the microscope.  Mastoid cavity open, posterior cavity with a small amount of cerumen laterally which was removed with a curette, there is a small area that is mucosalized over the lateral cavity but no evidence of infection, the lateral cavity is somewhat lateralized but today appears aerated underneath the lining whereas previously it was darker and appeared to possibly be sigmoid sinus but it does not appear to be the sigmoid today.    Assessment and plan:  Healthy mastoid cavity.  The previous infection is cleared.  She will return in 6 months.      Ana Gonsalez MD

## 2018-04-18 NOTE — MR AVS SNAPSHOT
After Visit Summary   4/18/2018    Aicha Batista    MRN: [id number]           Patient Information     Date Of Birth          1975        Visit Information        Provider Department      4/18/2018 2:30 PM Ana Gonsalez MD [facility] Ear Nose and Throat        Today's Diagnoses     Conductive hearing loss of right ear with unrestricted hearing of left ear    -  1      Care Instructions    You will need  to schedule a follow up appointment in 6 months for an ear check.   Please call our clinic for any questions,concerns,or worsening symptoms.      Clinic #[phone]       Option 3  for the triage nurse.          Follow-ups after your visit        Follow-up notes from your care team     Return in about 6 months (around 10/18/2018).      Who to contact     Please call your clinic at [phone] to:    Ask questions about your health    Make or cancel appointments    Discuss your medicines    Learn about your test results    Speak to your doctor            Additional Information About Your Visit        MyChart Information     [employer] gives you secure access to your electronic health record. If you see a primary care provider, you can also send messages to your care team and make appointments. If you have questions, please call your primary care clinic.  If you do not have a primary care provider, please call [phone] and they will assist you.      [employer] is an electronic gateway that provides easy, online access to your medical records. With [employer], you can request a clinic appointment, read your test results, renew a prescription or communicate with your care team.     To access your existing account, please contact your [facility] or call [phone] for assistance.        Care EveryWhere ID     This is your Care EveryWhere ID. This could be used by other organizations to access your [place] medical records  [id number]        Your Vitals Were     Height  "BMI (Body Mass Index)                1.63 m (5' 4.17\") 24.76 kg/m2           Blood Pressure from Last 3 Encounters:   11/03/17 122/70   07/23/07 110/78   12/06/05 100/62    Weight from Last 3 Encounters:   04/18/18 65.8 kg (145 lb)   11/09/17 65.3 kg (144 lb)   11/03/17 65.4 kg (144 lb 3.2 oz)              We Performed the Following     DEBRIDMENT MASTOID CAVITY, SIMPLE        Primary Care Provider Office Phone # Fax #    Jg Reynoso [phone]       [facility] [address]  [facility] [address]        Equal Access to Services     AUGUSTO ELLINGTON : Adolph Thurman, wafifi mejia, qaybta kaalmada obinna, анна perera. So [facility] [phone].    ATENCIÓN: Si habla español, tiene a hernandes disposición servicios gratuitos de asistencia lingüística. Llame al [phone].    We comply with applicable federal civil rights laws and [place] laws. We do not discriminate on the basis of race, color, national origin, age, disability, sex, sexual orientation, or gender identity.            Thank you!     Thank you for choosing [facility] EAR NOSE AND THROAT  for your care. Our goal is always to provide you with excellent care. Hearing back from our patients is one way we can continue to improve our services. Please take a few minutes to complete the written survey that you may receive in the mail after your visit with us. Thank you!             Your Updated Medication List - Protect others around you: Learn how to safely use, store and throw away your medicines at www.disposemymeds.org.          This list is accurate as of 4/18/18 11:59 PM.  Always use your most recent med list.                   Brand Name Dispense Instructions for use Diagnosis    polyethylene glycol Packet    MIRALAX/GLYCOLAX     Take 1 packet by mouth daily          "

## 2018-04-18 NOTE — PATIENT INSTRUCTIONS
You will need  to schedule a follow up appointment in 6 months for an ear check.   Please call our clinic for any questions,concerns,or worsening symptoms.      Clinic #[phone]       Option 3  for the triage nurse.

## 2018-04-18 NOTE — NURSING NOTE
"Chief Complaint   Patient presents with     RECHECK     follow up     Height 1.63 m (5' 4.17\"), weight 65.8 kg (145 lb).    Rasheed Pollock LPN    "

## 2018-04-20 NOTE — PROGRESS NOTES
Aicha Batista is seen for a right ear check.  She reports no drainage, pain or changes in hearing.    Physical examination:  female in no acute distress.  Alert and answering questions appropriately.  HB 1/6 bilaterally.  Right ear examined under the microscope.  Mastoid cavity open, posterior cavity with a small amount of cerumen laterally which was removed with a curette, there is a small area that is mucosalized over the lateral cavity but no evidence of infection, the lateral cavity is somewhat lateralized but today appears aerated underneath the lining whereas previously it was darker and appeared to possibly be sigmoid sinus but it does not appear to be the sigmoid today.    Assessment and plan:  Healthy mastoid cavity.  The previous infection is cleared.  She will return in 6 months.

## 2018-06-21 ENCOUNTER — OFFICE VISIT - HEALTHEAST (OUTPATIENT)
Dept: FAMILY MEDICINE | Facility: CLINIC | Age: 43
End: 2018-06-21

## 2018-06-21 DIAGNOSIS — E66.3 OVERWEIGHT: ICD-10-CM

## 2018-06-21 DIAGNOSIS — D51.9 ANEMIA DUE TO VITAMIN B12 DEFICIENCY, UNSPECIFIED B12 DEFICIENCY TYPE: ICD-10-CM

## 2018-06-21 ASSESSMENT — MIFFLIN-ST. JEOR: SCORE: 1284.78

## 2018-07-26 ENCOUNTER — COMMUNICATION - HEALTHEAST (OUTPATIENT)
Dept: FAMILY MEDICINE | Facility: CLINIC | Age: 43
End: 2018-07-26

## 2018-07-26 DIAGNOSIS — E66.3 OVERWEIGHT: ICD-10-CM

## 2018-08-29 ENCOUNTER — OFFICE VISIT - HEALTHEAST (OUTPATIENT)
Dept: FAMILY MEDICINE | Facility: CLINIC | Age: 43
End: 2018-08-29

## 2018-08-29 DIAGNOSIS — E66.3 OVERWEIGHT: ICD-10-CM

## 2018-08-29 DIAGNOSIS — D51.9 ANEMIA DUE TO VITAMIN B12 DEFICIENCY, UNSPECIFIED B12 DEFICIENCY TYPE: ICD-10-CM

## 2018-08-29 LAB
ERYTHROCYTE [DISTWIDTH] IN BLOOD BY AUTOMATED COUNT: 10.8 % (ref 11–14.5)
HCT VFR BLD AUTO: 37.1 % (ref 35–47)
HGB BLD-MCNC: 13 G/DL (ref 12–16)
MCH RBC QN AUTO: 33.9 PG (ref 27–34)
MCHC RBC AUTO-ENTMCNC: 35 G/DL (ref 32–36)
MCV RBC AUTO: 97 FL (ref 80–100)
PLATELET # BLD AUTO: 190 THOU/UL (ref 140–440)
PMV BLD AUTO: 7.8 FL (ref 7–10)
RBC # BLD AUTO: 3.83 MILL/UL (ref 3.8–5.4)
VIT B12 SERPL-MCNC: 320 PG/ML (ref 213–816)
WBC: 2.9 THOU/UL (ref 4–11)

## 2018-08-29 ASSESSMENT — MIFFLIN-ST. JEOR: SCORE: 1271.62

## 2018-10-25 ENCOUNTER — OFFICE VISIT (OUTPATIENT)
Dept: OTOLARYNGOLOGY | Facility: CLINIC | Age: 43
End: 2018-10-25
Payer: COMMERCIAL

## 2018-10-25 VITALS — HEIGHT: 64 IN | BODY MASS INDEX: 24.75 KG/M2 | WEIGHT: 145 LBS

## 2018-10-25 DIAGNOSIS — H90.11 CONDUCTIVE HEARING LOSS OF RIGHT EAR WITH UNRESTRICTED HEARING OF LEFT EAR: Primary | ICD-10-CM

## 2018-10-25 ASSESSMENT — PAIN SCALES - GENERAL: PAINLEVEL: NO PAIN (0)

## 2018-10-25 NOTE — NURSING NOTE
Chief Complaint   Patient presents with     RECHECK     6 month follow up - no specific concerns      Dallas Nielsen, EMT

## 2018-10-25 NOTE — LETTER
10/25/2018      RE[facility] [address]  [address] [facility] [place] [address]       Aicha Batista is seen for a right ear check.  She reports no otorrhea, otalgia or hearing changes.    Physical examination:  female in no acute distress.  Alert and answering questions appropriately.  HB 1/6 bilaterally.  Right ear examined under the microscope.  Mastoid cavity clean and healthy, there is a layer of skin over what appears to be an aerated posterior superior portion mastoid cavity, TM otherwise atelectatic over the middle ear.    Assessment and plan:  Healthy mastoid.  We'll see her in 6 months and if the cavity looks clean, we will go out to yearly checks.      Ana Gonsalez MD

## 2018-10-25 NOTE — PATIENT INSTRUCTIONS
You will need  to schedule a follow up appointment in 6 months.     Please call our clinic for any questions,concerns,or worsening symptoms.      Clinic #[phone]       Option 3  for the ENT Care Team.       Option 1 for scheduling.    Lisa GORDILLO RNJOHN  [phone]

## 2018-10-30 NOTE — PROGRESS NOTES
Aicha Batista is seen for a right ear check.  She reports no otorrhea, otalgia or hearing changes.    Physical examination:  female in no acute distress.  Alert and answering questions appropriately.  HB 1/6 bilaterally.  Right ear examined under the microscope.  Mastoid cavity clean and healthy, there is a layer of skin over what appears to be an aerated posterior superior portion mastoid cavity, TM otherwise atelectatic over the middle ear.    Assessment and plan:  Healthy mastoid.  We'll see her in 6 months and if the cavity looks clean, we will go out to yearly checks.

## 2018-11-05 ENCOUNTER — OFFICE VISIT - HEALTHEAST (OUTPATIENT)
Dept: FAMILY MEDICINE | Facility: CLINIC | Age: 43
End: 2018-11-05

## 2018-11-05 DIAGNOSIS — E66.3 OVERWEIGHT: ICD-10-CM

## 2018-11-05 ASSESSMENT — MIFFLIN-ST. JEOR: SCORE: 1282.51

## 2018-12-31 ENCOUNTER — OFFICE VISIT - HEALTHEAST (OUTPATIENT)
Dept: FAMILY MEDICINE | Facility: CLINIC | Age: 43
End: 2018-12-31

## 2018-12-31 DIAGNOSIS — E66.3 OVERWEIGHT: ICD-10-CM

## 2018-12-31 ASSESSMENT — MIFFLIN-ST. JEOR: SCORE: 1287.04

## 2019-02-04 ENCOUNTER — OFFICE VISIT - HEALTHEAST (OUTPATIENT)
Dept: FAMILY MEDICINE | Facility: CLINIC | Age: 44
End: 2019-02-04

## 2019-02-04 DIAGNOSIS — E66.3 OVERWEIGHT: ICD-10-CM

## 2019-02-04 ASSESSMENT — MIFFLIN-ST. JEOR: SCORE: 1278.88

## 2019-04-15 ENCOUNTER — OFFICE VISIT - HEALTHEAST (OUTPATIENT)
Dept: FAMILY MEDICINE | Facility: CLINIC | Age: 44
End: 2019-04-15

## 2019-04-15 DIAGNOSIS — E66.3 OVERWEIGHT: ICD-10-CM

## 2019-04-15 ASSESSMENT — MIFFLIN-ST. JEOR: SCORE: 1285.68

## 2019-05-22 ENCOUNTER — COMMUNICATION - HEALTHEAST (OUTPATIENT)
Dept: FAMILY MEDICINE | Facility: CLINIC | Age: 44
End: 2019-05-22

## 2019-05-22 DIAGNOSIS — E66.3 OVERWEIGHT: ICD-10-CM

## 2019-05-23 ENCOUNTER — COMMUNICATION - HEALTHEAST (OUTPATIENT)
Dept: FAMILY MEDICINE | Facility: CLINIC | Age: 44
End: 2019-05-23

## 2019-05-23 DIAGNOSIS — E66.3 OVERWEIGHT: ICD-10-CM

## 2019-07-15 ENCOUNTER — RECORDS - HEALTHEAST (OUTPATIENT)
Dept: ADMINISTRATIVE | Facility: OTHER | Age: 44
End: 2019-07-15

## 2019-07-22 ENCOUNTER — COMMUNICATION - HEALTHEAST (OUTPATIENT)
Dept: FAMILY MEDICINE | Facility: CLINIC | Age: 44
End: 2019-07-22

## 2019-07-22 DIAGNOSIS — E66.3 OVERWEIGHT: ICD-10-CM

## 2019-08-22 ENCOUNTER — OFFICE VISIT - HEALTHEAST (OUTPATIENT)
Dept: FAMILY MEDICINE | Facility: CLINIC | Age: 44
End: 2019-08-22

## 2019-08-22 ENCOUNTER — MYC MEDICAL ADVICE (OUTPATIENT)
Dept: OTOLARYNGOLOGY | Facility: CLINIC | Age: 44
End: 2019-08-22

## 2019-08-22 ENCOUNTER — COMMUNICATION - HEALTHEAST (OUTPATIENT)
Dept: FAMILY MEDICINE | Facility: CLINIC | Age: 44
End: 2019-08-22

## 2019-08-22 DIAGNOSIS — Z13.220 LIPID SCREENING: ICD-10-CM

## 2019-08-22 DIAGNOSIS — Z13.1 DIABETES MELLITUS SCREENING: ICD-10-CM

## 2019-08-22 DIAGNOSIS — D51.9 ANEMIA DUE TO VITAMIN B12 DEFICIENCY, UNSPECIFIED B12 DEFICIENCY TYPE: ICD-10-CM

## 2019-08-22 DIAGNOSIS — E66.3 OVERWEIGHT: ICD-10-CM

## 2019-08-22 DIAGNOSIS — E55.9 VITAMIN D DEFICIENCY: ICD-10-CM

## 2019-08-22 DIAGNOSIS — R53.83 FATIGUE, UNSPECIFIED TYPE: ICD-10-CM

## 2019-08-22 ASSESSMENT — MIFFLIN-ST. JEOR: SCORE: 1302.92

## 2019-08-23 ENCOUNTER — AMBULATORY - HEALTHEAST (OUTPATIENT)
Dept: LAB | Facility: CLINIC | Age: 44
End: 2019-08-23

## 2019-08-23 DIAGNOSIS — E55.9 VITAMIN D DEFICIENCY: ICD-10-CM

## 2019-08-23 DIAGNOSIS — R53.83 FATIGUE, UNSPECIFIED TYPE: ICD-10-CM

## 2019-08-23 DIAGNOSIS — Z13.220 LIPID SCREENING: ICD-10-CM

## 2019-08-23 DIAGNOSIS — D51.9 ANEMIA DUE TO VITAMIN B12 DEFICIENCY, UNSPECIFIED B12 DEFICIENCY TYPE: ICD-10-CM

## 2019-08-23 DIAGNOSIS — Z13.1 DIABETES MELLITUS SCREENING: ICD-10-CM

## 2019-08-23 LAB
ANION GAP SERPL CALCULATED.3IONS-SCNC: 11 MMOL/L (ref 5–18)
BUN SERPL-MCNC: 8 MG/DL (ref 8–22)
CALCIUM SERPL-MCNC: 9.5 MG/DL (ref 8.5–10.5)
CHLORIDE BLD-SCNC: 105 MMOL/L (ref 98–107)
CHOLEST SERPL-MCNC: 190 MG/DL
CO2 SERPL-SCNC: 24 MMOL/L (ref 22–31)
CREAT SERPL-MCNC: 0.8 MG/DL (ref 0.6–1.1)
ERYTHROCYTE [DISTWIDTH] IN BLOOD BY AUTOMATED COUNT: 10.8 % (ref 11–14.5)
FASTING STATUS PATIENT QL REPORTED: YES
GFR SERPL CREATININE-BSD FRML MDRD: >60 ML/MIN/1.73M2
GLUCOSE BLD-MCNC: 91 MG/DL (ref 70–125)
HCT VFR BLD AUTO: 39.6 % (ref 35–47)
HDLC SERPL-MCNC: 95 MG/DL
HGB BLD-MCNC: 13.3 G/DL (ref 12–16)
LDLC SERPL CALC-MCNC: 88 MG/DL
MCH RBC QN AUTO: 33.8 PG (ref 27–34)
MCHC RBC AUTO-ENTMCNC: 33.5 G/DL (ref 32–36)
MCV RBC AUTO: 101 FL (ref 80–100)
PLATELET # BLD AUTO: 194 THOU/UL (ref 140–440)
PMV BLD AUTO: 8.5 FL (ref 7–10)
POTASSIUM BLD-SCNC: 4.6 MMOL/L (ref 3.5–5)
RBC # BLD AUTO: 3.93 MILL/UL (ref 3.8–5.4)
SODIUM SERPL-SCNC: 140 MMOL/L (ref 136–145)
TRIGL SERPL-MCNC: 34 MG/DL
TSH SERPL DL<=0.005 MIU/L-ACNC: 2.49 UIU/ML (ref 0.3–5)
VIT B12 SERPL-MCNC: 272 PG/ML (ref 213–816)
WBC: 3 THOU/UL (ref 4–11)

## 2019-08-26 LAB
25(OH)D3 SERPL-MCNC: 32.9 NG/ML (ref 30–80)
B BURGDOR IGG+IGM SER QL: 0.2 INDEX VALUE

## 2019-08-27 ENCOUNTER — AMBULATORY - HEALTHEAST (OUTPATIENT)
Dept: FAMILY MEDICINE | Facility: CLINIC | Age: 44
End: 2019-08-27

## 2019-08-27 DIAGNOSIS — D51.9 ANEMIA DUE TO VITAMIN B12 DEFICIENCY, UNSPECIFIED B12 DEFICIENCY TYPE: ICD-10-CM

## 2019-08-29 ENCOUNTER — AMBULATORY - HEALTHEAST (OUTPATIENT)
Dept: NURSING | Facility: CLINIC | Age: 44
End: 2019-08-29

## 2019-09-05 ENCOUNTER — AMBULATORY - HEALTHEAST (OUTPATIENT)
Dept: NURSING | Facility: CLINIC | Age: 44
End: 2019-09-05

## 2019-09-12 ENCOUNTER — AMBULATORY - HEALTHEAST (OUTPATIENT)
Dept: NURSING | Facility: CLINIC | Age: 44
End: 2019-09-12

## 2019-09-17 ENCOUNTER — COMMUNICATION - HEALTHEAST (OUTPATIENT)
Dept: FAMILY MEDICINE | Facility: CLINIC | Age: 44
End: 2019-09-17

## 2019-09-17 DIAGNOSIS — E66.3 OVERWEIGHT: ICD-10-CM

## 2019-09-19 ENCOUNTER — COMMUNICATION - HEALTHEAST (OUTPATIENT)
Dept: FAMILY MEDICINE | Facility: CLINIC | Age: 44
End: 2019-09-19

## 2019-09-19 DIAGNOSIS — E66.3 OVERWEIGHT: ICD-10-CM

## 2019-09-20 ENCOUNTER — AMBULATORY - HEALTHEAST (OUTPATIENT)
Dept: NURSING | Facility: CLINIC | Age: 44
End: 2019-09-20

## 2019-09-23 ENCOUNTER — OFFICE VISIT (OUTPATIENT)
Dept: OTOLARYNGOLOGY | Facility: CLINIC | Age: 44
End: 2019-09-23
Payer: COMMERCIAL

## 2019-09-23 VITALS
HEART RATE: 52 BPM | SYSTOLIC BLOOD PRESSURE: 126 MMHG | BODY MASS INDEX: 25.54 KG/M2 | DIASTOLIC BLOOD PRESSURE: 73 MMHG | WEIGHT: 148.8 LBS

## 2019-09-23 DIAGNOSIS — H74.8X1 CHOLESTERIN GRANULOMA OF RIGHT MIDDLE EAR: Primary | ICD-10-CM

## 2019-09-23 ASSESSMENT — PAIN SCALES - GENERAL: PAINLEVEL: NO PAIN (0)

## 2019-09-23 NOTE — Clinical Note
9/23/2019       RE: Aicha Batista  [address]  [facility] [address]     Dear Colleague,    Thank you for referring your patient, Aicha Batista, to the [facility] EAR NOSE AND THROAT at [facility]. Please see a copy of my visit note below.    Interval History: Aicha Batista is seen for a right ear check.  She has a history of a chronically draining right mastoid cavity with improvement following topical and oral antibiotic therapy. She reports no otorrhea, otalgia or hearing changes.    Physical examination:  female in no acute distress.  Alert and answering questions appropriately.  HB 1/6 bilaterally.  Right ear examined under the microscope.  Mastoid cavity clean and healthy, there is a layer of fibrous tissue overlying what appears to be a fluid filled cavity in the posterosuperior portion of the mastoid cavity, this was widely opened in clinic with return of serous fluid, TM atelectatic, middle ear appears well aerated.    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 Recommend Ciprodex otic drops to right ear x2 weeks. We will continue to observe this at this time. We will see her back in 3-4 weeks to ensure resolution of cholesterol granuloma.       Jesus Vinson MD  Neurotology Fellow  Department of Otolaryngology - Head and Neck Surgery  [facility]      Again, thank you for allowing me to participate in the care of your patient.      Sincerely,    Ana Gonsalez MD

## 2019-09-23 NOTE — PROGRESS NOTES
Interval History: Aicha Batista is seen for a right ear check.  She has a history of a chronically draining right mastoid cavity with improvement following topical and oral antibiotic therapy. She reports no otorrhea, otalgia or hearing changes.    Physical examination:  female in no acute distress.  Alert and answering questions appropriately.  HB 1/6 bilaterally.  Right ear examined under the microscope.  Mastoid cavity clean and healthy, there is a layer of fibrous tissue overlying what appears to be a fluid filled cavity in the posterosuperior portion of the mastoid cavity, this was widely opened in clinic with return of dark brown serous fluid, TM atelectatic, middle ear appears well aerated.    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 Recommend Ciprodex otic drops to right ear x2 weeks. We will continue to observe this at this time. We will see her back in 3-4 weeks to ensure resolution of cholesterol granuloma.     Jesus Vinson MD  Neurotology Fellow  Department of Otolaryngology - Head and Neck Surgery  [facility]    I, Ana Gonsalez MD, saw this patient with the resident/fellow and agree with the resident/fellow s findings and plan of care as documented in the resident s/fellow s note. I was present for the entire procedure.    Ana Gonsalez MD

## 2019-09-23 NOTE — PATIENT INSTRUCTIONS
1.  You were seen in the ENT Clinic today by .  If you have any questions or concerns after your appointment, please call [phone]. Press option #1 for scheduling related needs. Press option #3 for Nurse advice.    2.  Plan is to return to clinic in 3-4 weeks, no audio      Karyn Quintero LPN  [facility] - Otolaryngology

## 2019-09-23 NOTE — LETTER
9/23/2019      RE: Aicha Batista  [address] [facility] [address]       Interval History: Aicha Batista is seen for a right ear check.  She has a history of a chronically draining right mastoid cavity with improvement following topical and oral antibiotic therapy. She reports no otorrhea, otalgia or hearing changes.    Physical examination:  female in no acute distress.  Alert and answering questions appropriately.  HB 1/6 bilaterally.  Right ear examined under the microscope.  Mastoid cavity clean and healthy, there is a layer of fibrous tissue overlying what appears to be a fluid filled cavity in the posterosuperior portion of the mastoid cavity, this was widely opened in clinic with return of dark brown serous fluid, TM atelectatic, middle ear appears well aerated.    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 Recommend Ciprodex otic drops to right ear x2 weeks. We will continue to observe this at this time. We will see her back in 3-4 weeks to ensure resolution of cholesterol granuloma.     Jesus Vinson MD  Neurotology Fellow  Department of Otolaryngology - Head and Neck Surgery  [facility]    I, Ana Gonsalez MD, saw this patient with the resident/fellow and agree with the resident/fellow s findings and plan of care as documented in the resident s/fellow s note. I was present for the entire procedure.    Ana Gonsalez MD

## 2019-09-26 ENCOUNTER — MYC MEDICAL ADVICE (OUTPATIENT)
Dept: OTOLARYNGOLOGY | Facility: CLINIC | Age: 44
End: 2019-09-26

## 2019-09-26 DIAGNOSIS — H92.11 OTORRHEA, RIGHT: Primary | ICD-10-CM

## 2019-09-26 RX ORDER — CIPROFLOXACIN AND DEXAMETHASONE 3; 1 MG/ML; MG/ML
5 SUSPENSION/ DROPS AURICULAR (OTIC) 2 TIMES DAILY
Status: ACTIVE | OUTPATIENT
Start: 2019-09-26 | End: 2019-10-10

## 2019-10-07 ENCOUNTER — MYC MEDICAL ADVICE (OUTPATIENT)
Dept: OTOLARYNGOLOGY | Facility: CLINIC | Age: 44
End: 2019-10-07

## 2019-10-07 RX ORDER — CIPROFLOXACIN AND DEXAMETHASONE 3; 1 MG/ML; MG/ML
SUSPENSION/ DROPS AURICULAR (OTIC)
Qty: 7.5 ML | Refills: 0 | Status: SHIPPED | OUTPATIENT
Start: 2019-10-07 | End: 2019-10-17

## 2019-10-10 ENCOUNTER — COMMUNICATION - HEALTHEAST (OUTPATIENT)
Dept: FAMILY MEDICINE | Facility: CLINIC | Age: 44
End: 2019-10-10

## 2019-10-10 DIAGNOSIS — E66.3 OVERWEIGHT: ICD-10-CM

## 2019-10-24 ENCOUNTER — OFFICE VISIT (OUTPATIENT)
Dept: OTOLARYNGOLOGY | Facility: CLINIC | Age: 44
End: 2019-10-24
Payer: COMMERCIAL

## 2019-10-24 VITALS
BODY MASS INDEX: 25.32 KG/M2 | WEIGHT: 147.5 LBS | DIASTOLIC BLOOD PRESSURE: 76 MMHG | SYSTOLIC BLOOD PRESSURE: 117 MMHG | HEART RATE: 69 BPM

## 2019-10-24 DIAGNOSIS — H90.11 CONDUCTIVE HEARING LOSS OF RIGHT EAR WITH UNRESTRICTED HEARING OF LEFT EAR: Primary | ICD-10-CM

## 2019-10-24 ASSESSMENT — PAIN SCALES - GENERAL: PAINLEVEL: NO PAIN (0)

## 2019-10-24 NOTE — LETTER
10/24/2019      RE: Aicha Batista  [address] [facility] [address]       Aicha Batista is seen for a right mastoid cavity check.  She had a cholesterol granuloma in the posterior superior mastoid cavity which was opened at the last visit.  She reports no otorrhea or otalgia, hearing seems stable.    Physical examination:  female in no acute distress.  Alert and answering questions appropriately.  HB 1/6 bilaterally.  Right ear examined under the microscope.  Mastoid cavity clean and healthy, the area that was opened last time has remained open with no further evidence of granuloma formation, the floor in the opened area is healthy.    Assessment and plan:  Doing well.  We'll go back to 6 month checks.      Ana Gonsalez MD

## 2019-10-24 NOTE — NURSING NOTE
Chief Complaint   Patient presents with     RECHECK     3-4 week follow up visit     Karyn Quintero LPN

## 2019-10-24 NOTE — Clinical Note
10/24/2019       RE: Aicha Batista  [address] [facility] [address]     Dear Colleague,    Thank you for referring your patient, Aicha Batista, to the [facility] EAR NOSE AND THROAT at [facility]. Please see a copy of my visit note below.    No notes on file    Again, thank you for allowing me to participate in the care of your patient.      Sincerely,    Ana Gonsalez MD

## 2019-10-24 NOTE — PATIENT INSTRUCTIONS
1. You were seen in the ENT Clinic today by .  If you have any questions or concerns after your appointment, please call   - Option 1: ENT Clinic: [phone]  - Option 2: Lizbeth ('s Nurse): [phone]    2.   Plan to return to clinic in 6 months without a hearing test.     LILIANA Nieves  [facility] Otolaryngology  [phone]

## 2019-10-28 ENCOUNTER — COMMUNICATION - HEALTHEAST (OUTPATIENT)
Dept: FAMILY MEDICINE | Facility: CLINIC | Age: 44
End: 2019-10-28

## 2019-11-07 ENCOUNTER — HEALTH MAINTENANCE LETTER (OUTPATIENT)
Age: 44
End: 2019-11-07

## 2019-11-09 NOTE — PROGRESS NOTES
Aicha Batista is seen for a right mastoid cavity check.  She had a cholesterol granuloma in the posterior superior mastoid cavity which was opened at the last visit.  She reports no otorrhea or otalgia, hearing seems stable.    Physical examination:  female in no acute distress.  Alert and answering questions appropriately.  HB 1/6 bilaterally.  Right ear examined under the microscope.  Mastoid cavity clean and healthy, the area that was opened last time has remained open with no further evidence of granuloma formation, the floor in the opened area is healthy.    Assessment and plan:  Doing well.  We'll go back to 6 month checks.

## 2019-12-11 ENCOUNTER — COMMUNICATION - HEALTHEAST (OUTPATIENT)
Dept: FAMILY MEDICINE | Facility: CLINIC | Age: 44
End: 2019-12-11

## 2019-12-11 DIAGNOSIS — E66.3 OVERWEIGHT: ICD-10-CM

## 2020-01-31 ENCOUNTER — OFFICE VISIT - HEALTHEAST (OUTPATIENT)
Dept: FAMILY MEDICINE | Facility: CLINIC | Age: 45
End: 2020-01-31

## 2020-01-31 DIAGNOSIS — E66.3 OVERWEIGHT: ICD-10-CM

## 2020-01-31 ASSESSMENT — MIFFLIN-ST. JEOR: SCORE: 1287.5

## 2020-03-25 ENCOUNTER — COMMUNICATION - HEALTHEAST (OUTPATIENT)
Dept: FAMILY MEDICINE | Facility: CLINIC | Age: 45
End: 2020-03-25

## 2020-03-25 DIAGNOSIS — E66.3 OVERWEIGHT: ICD-10-CM

## 2020-04-13 ENCOUNTER — OFFICE VISIT - HEALTHEAST (OUTPATIENT)
Dept: FAMILY MEDICINE | Facility: CLINIC | Age: 45
End: 2020-04-13

## 2020-04-13 DIAGNOSIS — D51.9 ANEMIA DUE TO VITAMIN B12 DEFICIENCY, UNSPECIFIED B12 DEFICIENCY TYPE: ICD-10-CM

## 2020-04-13 ASSESSMENT — MIFFLIN-ST. JEOR: SCORE: 1289.31

## 2020-05-20 ENCOUNTER — TELEPHONE (OUTPATIENT)
Dept: OTOLARYNGOLOGY | Facility: CLINIC | Age: 45
End: 2020-05-20

## 2020-05-20 NOTE — TELEPHONE ENCOUNTER
LVM to reschedule to video visit,      If pt would like to do telephone visit for 5/28 appt please confirm best number for contact. If pt would like video visit please confirm e-mail and sign them up for mychart as we will sent set up instructions though that.     If patient wants to reschedule to in person visit, schedule 8-12 weeks out.

## 2020-05-27 ENCOUNTER — COMMUNICATION - HEALTHEAST (OUTPATIENT)
Dept: FAMILY MEDICINE | Facility: CLINIC | Age: 45
End: 2020-05-27

## 2020-05-27 DIAGNOSIS — E66.3 OVERWEIGHT: ICD-10-CM

## 2020-05-29 ENCOUNTER — COMMUNICATION - HEALTHEAST (OUTPATIENT)
Dept: FAMILY MEDICINE | Facility: CLINIC | Age: 45
End: 2020-05-29

## 2020-05-29 DIAGNOSIS — E66.3 OVERWEIGHT: ICD-10-CM

## 2020-07-23 ENCOUNTER — COMMUNICATION - HEALTHEAST (OUTPATIENT)
Dept: FAMILY MEDICINE | Facility: CLINIC | Age: 45
End: 2020-07-23

## 2020-07-23 DIAGNOSIS — E66.3 OVERWEIGHT: ICD-10-CM

## 2020-10-26 ENCOUNTER — OFFICE VISIT (OUTPATIENT)
Dept: OTOLARYNGOLOGY | Facility: CLINIC | Age: 45
End: 2020-10-26
Payer: COMMERCIAL

## 2020-10-26 ENCOUNTER — COMMUNICATION - HEALTHEAST (OUTPATIENT)
Dept: FAMILY MEDICINE | Facility: CLINIC | Age: 45
End: 2020-10-26

## 2020-10-26 VITALS
HEART RATE: 56 BPM | WEIGHT: 158 LBS | DIASTOLIC BLOOD PRESSURE: 80 MMHG | BODY MASS INDEX: 27.12 KG/M2 | SYSTOLIC BLOOD PRESSURE: 137 MMHG | OXYGEN SATURATION: 100 % | TEMPERATURE: 98.3 F

## 2020-10-26 DIAGNOSIS — H90.11 CONDUCTIVE HEARING LOSS OF RIGHT EAR WITH UNRESTRICTED HEARING OF LEFT EAR: Primary | ICD-10-CM

## 2020-10-26 PROCEDURE — 92504 EAR MICROSCOPY EXAMINATION: CPT | Performed by: OTOLARYNGOLOGY

## 2020-10-26 ASSESSMENT — PAIN SCALES - GENERAL: PAINLEVEL: NO PAIN (0)

## 2020-10-26 NOTE — LETTER
10/26/2020      [facility]ista  [address] [facility] [address]       Chief Complaint   Patient presents with     RECHECK     r/s per f.u     Blood pressure 137/80, pulse 56, temperature 98.3  F (36.8  C), temperature source Temporal, weight 71.7 kg (158 lb), SpO2 100 %.    Bharti Tipton LPN      Aicha Batista is seen for a right ear check. She reports that her ear is doing well with no otorrhea, otalgia or changes in hearing. Left ear fine.    Physical examination:  female in no acute distress.  Alert and answering questions appropriately.  HB 1/6 bilaterally.  Both ears examined under the microscope. Right mastoid cavity clean and healthy with no areas of granulation or mucosalization. Left ear canal clear, TM intact with an aerated middle ear.    Assessment and plan:  Doing well. We'll see her in a year.        Ana Gonsalez MD

## 2020-10-26 NOTE — Clinical Note
10/26/2020       [facility]  [address] [facility] [address]     Dear Colleague,    Thank you for referring your patient, Aicha Batista, to the [facility] EAR NOSE AND THROAT CLINIC [place] [facility]. Please see a copy of my visit note below.    Chief Complaint   Patient presents with     RECHECK     r/s per f.u     Blood pressure 137/80, pulse 56, temperature 98.3  F (36.8  C), temperature source Temporal, weight 71.7 kg (158 lb), SpO2 100 %.    Bharti Tipton LPN        Again, thank you for allowing me to participate in the care of your patient.      Sincerely,    Ana Gonsalez MD

## 2020-10-26 NOTE — PATIENT INSTRUCTIONS
1. You were seen in the ENT Clinic today by .  If you have any questions or concerns after your appointment, please call   - Option 1: ENT Clinic: [phone]  - Option 2: Lizbeth ('s Nurse): [phone]    2.   Plan to return to clinic in one year without a new hearing test.     LILIANA Nieves  Care Coordinator  [facility] Otolaryngology  [phone]

## 2020-10-28 ENCOUNTER — COMMUNICATION - HEALTHEAST (OUTPATIENT)
Dept: FAMILY MEDICINE | Facility: CLINIC | Age: 45
End: 2020-10-28

## 2020-10-29 ENCOUNTER — OFFICE VISIT - HEALTHEAST (OUTPATIENT)
Dept: FAMILY MEDICINE | Facility: CLINIC | Age: 45
End: 2020-10-29

## 2020-10-29 ENCOUNTER — COMMUNICATION - HEALTHEAST (OUTPATIENT)
Dept: FAMILY MEDICINE | Facility: CLINIC | Age: 45
End: 2020-10-29

## 2020-10-29 DIAGNOSIS — E66.3 OVERWEIGHT: ICD-10-CM

## 2020-10-29 DIAGNOSIS — E06.3 HASHIMOTO'S THYROIDITIS: ICD-10-CM

## 2020-10-29 DIAGNOSIS — D51.9 ANEMIA DUE TO VITAMIN B12 DEFICIENCY, UNSPECIFIED B12 DEFICIENCY TYPE: ICD-10-CM

## 2020-10-29 ASSESSMENT — MIFFLIN-ST. JEOR: SCORE: 1330.14

## 2020-10-29 NOTE — PROGRESS NOTES
Aicha Batista is seen for a right ear check. She reports that her ear is doing well with no otorrhea, otalgia or changes in hearing. Left ear fine.    Physical examination:  female in no acute distress.  Alert and answering questions appropriately.  HB 1/6 bilaterally.  Both ears examined under the microscope. Right mastoid cavity clean and healthy with no areas of granulation or mucosalization. Left ear canal clear, TM intact with an aerated middle ear.    Assessment and plan:  Doing well. We'll see her in a year.

## 2020-10-31 ENCOUNTER — COMMUNICATION - HEALTHEAST (OUTPATIENT)
Dept: SCHEDULING | Facility: CLINIC | Age: 45
End: 2020-10-31

## 2020-11-29 ENCOUNTER — HEALTH MAINTENANCE LETTER (OUTPATIENT)
Age: 45
End: 2020-11-29

## 2020-12-18 ENCOUNTER — COMMUNICATION - HEALTHEAST (OUTPATIENT)
Dept: FAMILY MEDICINE | Facility: CLINIC | Age: 45
End: 2020-12-18

## 2020-12-18 DIAGNOSIS — E66.3 OVERWEIGHT: ICD-10-CM

## 2020-12-18 DIAGNOSIS — E06.3 HASHIMOTO'S THYROIDITIS: ICD-10-CM

## 2021-05-27 NOTE — PROGRESS NOTES
PLAN  Continue medications.  This month concentrate on increasing exercise    Overweight  Holding steady.  As such will add in topiramate at low dose.  Side effects precautions discussed.  Follow-up in 2 months for recheck.        Initial Weight: 150 lbs  Weight: 145 lb 3.2 oz (65.9 kg)  Weight loss from initial: 4.8  % Weight loss: 3.2 %      Are you experiencing any side effects to the medications:  No  Hunger control:  good  Exercise was discussed: yes  Taking supplements:  yes  Discussed journaling food:  yes  Patient is pleased with the current results:  no  The patient is following the nutrition plan:  yes  Barriers to losing weight:  Behavior:  social calories    Vitals:    04/15/19 1444   BP: 100/62   Pulse: 64   Resp: 12   Temp: 98.4  F (36.9  C)     General: Alert and oriented ×3  Heart: Regular rate and rhythm without murmur or gallop  Lungs: Clear to auscultation bilateral  Extremities: No cyanosis or edema

## 2021-05-28 ENCOUNTER — COMMUNICATION - HEALTHEAST (OUTPATIENT)
Dept: FAMILY MEDICINE | Facility: CLINIC | Age: 46
End: 2021-05-28

## 2021-05-29 NOTE — TELEPHONE ENCOUNTER
Pt states she is taking topiramate 1/2 tab 2 x daily.  rx is for 1/2 tab daily.  If she is to use 2 x daily please send in new rx with phentermine

## 2021-05-30 VITALS — HEIGHT: 64 IN | BODY MASS INDEX: 23.51 KG/M2 | WEIGHT: 137.7 LBS

## 2021-05-30 VITALS — HEIGHT: 64 IN | BODY MASS INDEX: 24.09 KG/M2 | WEIGHT: 141.1 LBS

## 2021-05-30 NOTE — TELEPHONE ENCOUNTER
RN cannot approve Refill Request    RN can NOT refill this medication PCP messaged that patient is overdue for Labs. Last office visit: 4/15/2019 Mason Up DO Last Physical: Visit date not found Last MTM visit: Visit date not found Last visit same specialty: 4/15/2019 Mason Up DO.  Next visit within 3 mo: Visit date not found  Next physical within 3 mo: Visit date not found      Leandra HIGGINS Stacy, [facility] Triage/Med Refill 7/22/2019    Requested Prescriptions   Pending Prescriptions Disp Refills     topiramate (TOPAMAX) 25 MG tablet [Pharmacy Med Name: TOPIRAMATE 25MG TABLETS] 30 tablet 0     Sig: TAKE 1/2 TABLET(12.5 MG) BY MOUTH AT BEDTIME       Topiramate Refill Protocol  Failed - 7/22/2019 12:57 PM        Failed - Bicarbonate/Electrolyte panel in last 12 months     Sodium   Date Value Ref Range Status   10/10/2014 144 136 - 145 mmol/L Final     Potassium   Date Value Ref Range Status   10/10/2014 5.0 3.5 - 5.0 mmol/L Final     Chloride   Date Value Ref Range Status   10/10/2014 107 98 - 107 mmol/L Final     CO2   Date Value Ref Range Status   10/10/2014 27 22 - 31 mmol/L Final                Failed - Serum creatinine in last 12 months     Creatinine   Date Value Ref Range Status   10/10/2014 0.77 0.60 - 1.10 mg/dL Final             Passed - PCP or prescribing provider visit in last 12 months or next 3 months     Last office visit with prescriber/PCP: 4/15/2019 Mason Up DO OR same dept: 4/15/2019 Mason Up DO OR same specialty: 4/15/2019 Mason Up DO  Last physical: Visit date not found Last MTM visit: Visit date not found   Next visit within 3 mo: Visit date not found  Next physical within 3 mo: Visit date not found  Prescriber OR PCP: Mason Up DO  Last diagnosis associated with med order: 1. Overweight  - topiramate (TOPAMAX) 25 MG tablet [Pharmacy Med Name: TOPIRAMATE 25MG TABLETS]; TAKE 1/2 TABLET(12.5  MG) BY MOUTH AT BEDTIME  Dispense: 30 tablet; Refill: 0    If protocol passes may refill for 12 months if within 3 months of last provider visit (or a total of 15 months).

## 2021-05-31 VITALS — BODY MASS INDEX: 24.14 KG/M2 | HEIGHT: 64 IN | WEIGHT: 141.4 LBS

## 2021-05-31 VITALS — HEIGHT: 64 IN | BODY MASS INDEX: 24.02 KG/M2 | WEIGHT: 140.7 LBS

## 2021-05-31 VITALS — WEIGHT: 140 LBS | HEIGHT: 64 IN | BODY MASS INDEX: 23.9 KG/M2

## 2021-05-31 NOTE — TELEPHONE ENCOUNTER
Left message to call back for: nurse only appts  Information to relay to patient: see below and schedule nurse only appt for B-12 injection

## 2021-05-31 NOTE — TELEPHONE ENCOUNTER
Problem List Items Addressed This Visit     Anemia, B12 deficiency - Primary     B12 levels are low. Will start B12 injections weekly for first 4 weeks, then monthly for 6 months following.     Patient was notified through [employer].    Please call and help arrange nurse visit to start injections.     Thank you.

## 2021-05-31 NOTE — PROGRESS NOTES
PLAN  This month concentrate on increasing exercise    Anemia, B12 deficiency  Has not been taking B12 supplements.  With recent increase in fatigue, will recheck.  Patient also due for fasting labs.  As such will also get those at the time the lab draw.  Will check B12 level is that has been her deficient state previously.  Diet and exercise reviewed.    Overweight  Holding stable but not decreasing further.  Recommend to hold the topiramate until the fatigue aspect is fully discerned.    Vitamin D Deficiency  Recheck levels and adjust supplementation as needed.        Weight: 149 lb (67.6 kg)      Are you experiencing any side effects to the medications:  No  Hunger control:  good  Exercise was discussed: yes  Taking supplements:  yes  Discussed journaling food:  yes  Patient is pleased with the current results:  no  The patient is following the nutrition plan:  yes  Barriers to losing weight:  Behavior:  Amnesia Calories:   habit   Patient is also had increased fatigue over the past 3 months.  She does find when she works out that some days it makes her feel better but other days and she makes her feel more tired.  Also while she is doing the exercise she actually feels fatigued.  She has had some increase in her allergies but expect those to improve in the next couple of days.  Periods have been normal.  However as she stated it feels a lot like when she did have anemia previously.    Vitals:    08/22/19 1611   BP: 116/84   Pulse: 61   SpO2: 99%     General: Alert and oriented ×3  Heart: Regular rate and rhythm without murmur or gallop  Lungs: Clear to auscultation bilateral  Extremities: No cyanosis or edema

## 2021-06-01 VITALS — HEIGHT: 64 IN | BODY MASS INDEX: 24.75 KG/M2 | WEIGHT: 145 LBS

## 2021-06-01 NOTE — TELEPHONE ENCOUNTER
Please contact patient and ask her about the topiramate.  Documentation suggests that this medication is been discontinued.  Please get some additional information as a refill request was received.

## 2021-06-01 NOTE — TELEPHONE ENCOUNTER
In reviewing the patient's chart, this medication was stopped by Dr. Up on 8/22. Refill request will need to wait for him to review. Please notify patient.

## 2021-06-01 NOTE — TELEPHONE ENCOUNTER
RN cannot approve Refill Request    RN can NOT refill this medication pt stated is taking 25 mg daily not 12.5 mg.      Olinda Sharma, [facility] Triage/Med Refill 9/17/2019    Requested Prescriptions   Pending Prescriptions Disp Refills     topiramate (TOPAMAX) 25 MG tablet [Pharmacy Med Name: TOPIRAMATE 25MG TABLETS] 30 tablet 0     Sig: TAKE 1/2 TABLET(12.5 MG) BY MOUTH AT BEDTIME       Topiramate Refill Protocol  Passed - 9/17/2019 10:26 AM        Passed - Bicarbonate/Electrolyte panel in last 12 months     Sodium   Date Value Ref Range Status   08/23/2019 140 136 - 145 mmol/L Final     Potassium   Date Value Ref Range Status   08/23/2019 4.6 3.5 - 5.0 mmol/L Final     Chloride   Date Value Ref Range Status   08/23/2019 105 98 - 107 mmol/L Final     CO2   Date Value Ref Range Status   08/23/2019 24 22 - 31 mmol/L Final                Passed - PCP or prescribing provider visit in last 12 months or next 3 months     Last office visit with prescriber/PCP: 8/22/2019 Mason Up DO OR same dept: 8/22/2019 Mason pU DO OR same specialty: 8/22/2019 Mason Up DO  Last physical: Visit date not found Last MTM visit: Visit date not found   Next visit within 3 mo: Visit date not found  Next physical within 3 mo: Visit date not found  Prescriber OR PCP: Mason Up DO  Last diagnosis associated with med order: 1. Overweight  - topiramate (TOPAMAX) 25 MG tablet [Pharmacy Med Name: TOPIRAMATE 25MG TABLETS]; TAKE 1/2 TABLET(12.5 MG) BY MOUTH AT BEDTIME  Dispense: 30 tablet; Refill: 0    If protocol passes may refill for 12 months if within 3 months of last provider visit (or a total of 15 months).             Passed - Serum creatinine in last 12 months     Creatinine   Date Value Ref Range Status   08/23/2019 0.80 0.60 - 1.10 mg/dL Final

## 2021-06-01 NOTE — TELEPHONE ENCOUNTER
Refill Approved    Rx renewed per Medication Renewal Policy. Medication was last renewed on 9/19/19. (Requesting a 90 day supply)    Samantha Bhakta, [facility] Triage/Med Refill 9/19/2019     Requested Prescriptions   Pending Prescriptions Disp Refills     topiramate (TOPAMAX) 25 MG tablet [Pharmacy Med Name: TOPIRAMATE 25MG TABLETS] 45 tablet 0     Sig: TAKE 1/2 TABLET(12.5 MG) BY MOUTH AT BEDTIME       Topiramate Refill Protocol  Passed - 9/19/2019  5:23 PM        Passed - Bicarbonate/Electrolyte panel in last 12 months     Sodium   Date Value Ref Range Status   08/23/2019 140 136 - 145 mmol/L Final     Potassium   Date Value Ref Range Status   08/23/2019 4.6 3.5 - 5.0 mmol/L Final     Chloride   Date Value Ref Range Status   08/23/2019 105 98 - 107 mmol/L Final     CO2   Date Value Ref Range Status   08/23/2019 24 22 - 31 mmol/L Final                Passed - PCP or prescribing provider visit in last 12 months or next 3 months     Last office visit with prescriber/PCP: Visit date not found OR same dept: 8/22/2019 Mason Up DO OR same specialty: 8/22/2019 Mason Up DO  Last physical: Visit date not found Last MTM visit: Visit date not found   Next visit within 3 mo: Visit date not found  Next physical within 3 mo: Visit date not found  Prescriber OR PCP: Dorie Parekh MD  Last diagnosis associated with med order: 1. Overweight  - topiramate (TOPAMAX) 25 MG tablet [Pharmacy Med Name: TOPIRAMATE 25MG TABLETS]; TAKE 1/2 TABLET(12.5 MG) BY MOUTH AT BEDTIME  Dispense: 45 tablet; Refill: 0    If protocol passes may refill for 12 months if within 3 months of last provider visit (or a total of 15 months).             Passed - Serum creatinine in last 12 months     Creatinine   Date Value Ref Range Status   08/23/2019 0.80 0.60 - 1.10 mg/dL Final

## 2021-06-02 VITALS — HEIGHT: 64 IN | WEIGHT: 142.1 LBS | BODY MASS INDEX: 24.26 KG/M2

## 2021-06-02 VITALS — BODY MASS INDEX: 24.67 KG/M2 | HEIGHT: 64 IN | WEIGHT: 144.5 LBS

## 2021-06-02 VITALS — BODY MASS INDEX: 24.84 KG/M2 | WEIGHT: 145.5 LBS | HEIGHT: 64 IN

## 2021-06-02 VITALS — BODY MASS INDEX: 24.53 KG/M2 | WEIGHT: 143.7 LBS | HEIGHT: 64 IN

## 2021-06-02 NOTE — TELEPHONE ENCOUNTER
Controlled Substance Refill Request  Medication:   Requested Prescriptions     Pending Prescriptions Disp Refills     phentermine (ADIPEX-P) 37.5 mg tablet 30 tablet 0     Sig: Take 0.5 tablets (18.75 mg total) by mouth 2 (two) times a day.     Date Last Fill: 8.22.19  Pharmacy: [facility]   Submit electronically to pharmacy  Controlled Substance Agreement on File:   Encounter-Level CSA Scan Date:    There are no encounter-level csa scan date.       Last office visit: Last office visit pertaining to requested medication was 8.22.19.

## 2021-06-03 VITALS — BODY MASS INDEX: 25.44 KG/M2 | WEIGHT: 149 LBS | HEIGHT: 64 IN

## 2021-06-03 VITALS — HEIGHT: 64 IN | BODY MASS INDEX: 24.79 KG/M2 | WEIGHT: 145.2 LBS

## 2021-06-04 VITALS
RESPIRATION RATE: 12 BRPM | HEIGHT: 64 IN | HEART RATE: 64 BPM | WEIGHT: 145.6 LBS | SYSTOLIC BLOOD PRESSURE: 110 MMHG | TEMPERATURE: 98.4 F | BODY MASS INDEX: 24.86 KG/M2 | DIASTOLIC BLOOD PRESSURE: 74 MMHG

## 2021-06-04 VITALS — HEIGHT: 64 IN | WEIGHT: 146 LBS | BODY MASS INDEX: 24.92 KG/M2

## 2021-06-04 NOTE — TELEPHONE ENCOUNTER
RN cannot approve Refill Request    RN can NOT refill this medication med is not covered by policy/route to provider. Last office visit: Visit date not found Last Physical: Visit date not found Last MTM visit: Visit date not found Last visit same specialty: 8/22/2019 Mason Up, .  Next visit within 3 mo: Visit date not found  Next physical within 3 mo: Visit date not found      Jocelin Leahy, [facility] Triage/Med Refill 12/11/2019    Requested Prescriptions   Pending Prescriptions Disp Refills     phentermine (ADIPEX-P) 37.5 mg tablet 30 tablet 0     Sig: Take 0.5 tablets (18.75 mg total) by mouth 2 (two) times a day.       There is no refill protocol information for this order

## 2021-06-05 VITALS
BODY MASS INDEX: 26.46 KG/M2 | SYSTOLIC BLOOD PRESSURE: 112 MMHG | WEIGHT: 155 LBS | DIASTOLIC BLOOD PRESSURE: 60 MMHG | RESPIRATION RATE: 18 BRPM | HEIGHT: 64 IN | HEART RATE: 72 BPM | OXYGEN SATURATION: 99 %

## 2021-06-05 NOTE — PROGRESS NOTES
PLAN  Follow up in 1 month.  Continue medications.  Discussed realistic expectations    Overweight  Discussed realistic goals.  Will continue current treatment plan.  Also at next visit will plan to redo In-Body to determine actual composition and not just simply BMI.        Initial Weight: 150 lbs  Weight: 145 lb 9.6 oz (66 kg)  Weight loss from initial: 4.4  % Weight loss: 2.93 %      Are you experiencing any side effects to the medications:  No  Hunger control:  good  Exercise was discussed: yes  Taking supplements:  yes  Discussed journaling food:  n/a  Patient is pleased with the current results:  no, patient has goal of 130 pounds.  Reviewed with patient notes from 10 years ago in which patient's weight was 150.  Also low since that timeframe and office of 140.  Discussed that 130 even 135 may be an unrealistic goal.  The patient is following the nutrition plan:  yes  Barriers to losing weight:  Behavior:  social calories    Vitals:    01/31/20 1336   BP: 110/74   Pulse: 64   Resp: 12   Temp: 98.4  F (36.9  C)     General: Alert and oriented ×3  Heart: Regular rate and rhythm without murmur or gallop  Lungs: Clear to auscultation bilateral  Extremities: No cyanosis or edema

## 2021-06-07 NOTE — TELEPHONE ENCOUNTER
Controlled Substance Refill Request  Medication Name:   Requested Prescriptions     Pending Prescriptions Disp Refills     phentermine (ADIPEX-P) 37.5 mg tablet 30 tablet 0     Sig: Take 0.5 tablets (18.75 mg total) by mouth 2 (two) times a day.     Date Last Fill: 1/31/20  Requested Pharmacy: [facility]  Submit electronically to pharmacy  Controlled Substance Agreement on file:   Encounter-Level CSA Scan Date:    There are no encounter-level csa scan date.        Last office visit:  1/31/20

## 2021-06-07 NOTE — PROGRESS NOTES
"Aicha Batista is a 44 y.o. female who is being evaluated via a billable telephone visit.      The patient has been notified of following:     \"This telephone visit will be conducted via a call between you and your physician/provider. We have found that certain health care needs can be provided without the need for a physical exam.  This service lets us provide the care you need with a short phone conversation.  If a prescription is necessary we can send it directly to your pharmacy.  If lab work is needed we can place an order for that and you can then stop by our lab to have the test done at a later time.    Telephone visits are billed at different rates depending on your insurance coverage. During this emergency period, for some insurers they may be billed the same as an in-person visit.  Please reach out to your insurance provider with any questions.    If during the course of the call the physician/provider feels a telephone visit is not appropriate, you will not be charged for this service.\"    Patient has given verbal consent to a Telephone visit? Yes    Patient would like to receive their AVS by AVS Preference: [place].      Alo Martinez Jr., [facility]    PLAN  Continue medications.  Continue supplements.    Anemia, B12 deficiency  Doing very well with current boredom factors and exercise factors.  Her weight is staying stable by her home scale at 146 pounds.  Will continue current treatment modality.  Discussed avenues to be wary of to include the boredom factor not only in herself but in her children.  Also discussed how to make a more routine day for her children which in turn will make more routine for her.    Continue taking oral B12 supplementation until clinic visit can restart and will recheck her levels at that time to determine if she can stay on orals or move back to injected        Initial Weight: 150 lbs  Weight: 146 lb (66.2 kg) (Per pt)  Weight loss from initial: 4  % Weight loss: 2.67 " %      Are you experiencing any side effects to the medications:  No  Hunger control:  good  Exercise was discussed: yes  Taking supplements:  yes  Discussed journaling food:  n/a  Patient is pleased with the current results:  yes  The patient is following the nutrition plan:  yes  Barriers to losing weight:  Behavior:  Amnesia Calories:   boredom    There were no vitals filed for this visit.  No physical exam.  Due to viral pandemic this encounter was handled by telephone    Total time: 9 minutes 42 seconds

## 2021-06-08 NOTE — TELEPHONE ENCOUNTER
Controlled Substance Refill Request  Medication Name:   Requested Prescriptions     Pending Prescriptions Disp Refills     phentermine (ADIPEX-P) 37.5 mg tablet 30 tablet 0     Sig: Take 0.5 tablets (18.75 mg total) by mouth 2 (two) times a day.     Date Last Fill: 3/26/20  Requested Pharmacy: [facility]  Submit electronically to pharmacy  Controlled Substance Agreement on file:   Encounter-Level CSA Scan Date:    There are no encounter-level csa scan date.        Last office visit:  4/13/20

## 2021-06-08 NOTE — TELEPHONE ENCOUNTER
Left message to call back for: Aicha  Information to relay to patient:  See message below and assist in scheduling a video visit if she is interested in a video visit with Dr. Pinedo to discuss weight loss      I suspect that this patient was discouraged from participating in the weight loss program because her BMI was low.     Please reach out to her.  If she would like to meet with me via video, we could talk about her current strategy and consider if there is any modifications she may want to make.      Jocelin Pino LPN

## 2021-06-08 NOTE — PROGRESS NOTES
PLAN  Step 1:  Meds two days then skip a day: or skip every Monday and Thursday.  Return to the clinic in 5 weeks.    Overweight  Patient doing very well in the transition.  However she is only been doing it on Mondays and Thursdays.  For the next 6 weeks, continue to skip 2 doses through the week, but would like patient to change the days in which she is doing it to see what cravings and what adaptations are needed on the different days.  Food preparation to include proteins discussed.  Follow-up in 6 weeks.        Initial Weight: 150 lbs  Weight: 137 lb 11.2 oz (62.5 kg)  Weight loss from initial: 12.3  % Weight loss: 8.2 %    Are you experiencing any side effects to the medications:  No  Hunger control:  good  Exercise was discussed: yes  Taking supplements:  yes  Discussed journaling food:  yes  Patient is pleased with the current results:  yes  The patient is following the nutrition plan:  yes  Barriers to losing weight:  Behavior:  Amnesia Calories:   habit and social    Vitals:    02/14/17 1536   BP: 114/76   Pulse: 64   Resp: 12   Temp: 98.1  F (36.7  C)     General: Alert and oriented ×3  Heart: Regular rate and rhythm without murmur or gallop  Lungs: Clear to auscultation bilateral  Extremities: No cyanosis or edema

## 2021-06-10 NOTE — TELEPHONE ENCOUNTER
RN cannot approve Refill Request    RN can NOT refill this medication med is not covered by policy/route to provider. Last office visit: 1/31/2020 Mason Up DO Last Physical: Visit date not found Last MTM visit: Visit date not found Last visit same specialty: 1/31/2020 Mason Up DO.  Next visit within 3 mo: Visit date not found  Next physical within 3 mo: Visit date not found      Clarissa Bello, [facility] Triage/Med Refill 7/26/2020    Requested Prescriptions   Pending Prescriptions Disp Refills     phentermine (ADIPEX-P) 37.5 mg tablet 30 tablet 0     Sig: Take 0.5 tablets (18.75 mg total) by mouth 2 (two) times a day.       There is no refill protocol information for this order

## 2021-06-10 NOTE — TELEPHONE ENCOUNTER
[place]  data:    5/29/20:  #30/30 days  3/26/20:  #30/30 days  1/31/20:  #30/30 days    No data for [place].

## 2021-06-10 NOTE — PROGRESS NOTES
ASSESSMENT:  1. Overweight  phentermine (ADIPEX-P) 37.5 mg tablet         PLAN:  Continue supplements.  We will continue to wean off of phentermine.  We will have her take it every other day for the next month then go down to taking it every third day.  We will have her follow-up in 2 months.  Recommend increasing movement throughout the day--sitting less, moving more.  Will increase activity over time to reach a goal of at least 150 minutes of moderate exercise each week.  Behavior modification:  Cognitive restructuring exercises, journaling stressors, triggers for food cravings.  Dietary Intervention:  Reduced calorie, reduced carbohydrates, whole food diet.  Greater than 50% of this 15 minute visit was spent in counseling regarding patient's obesity, medications, dietary, exercise, and behavior modification recommendations.      SUBJECTIVE  Patient presents for treatment of overweight and prevention of chronic, comorbid conditions using intensive therapeutic lifestyle interventions including nutrition, physical activity, and behavior management.  She feels like she is doing really well.  She has been weaning off of phentermine and does not feel any different on the days that she does not take it.  She has been exercising 4-5 days per week.  She has been doing a good job of eating breakfast regularly getting adequate protein and reducing her take of processed and refined carbohydrates.    Are you experiencing any side effects to the medications:  No  Hunger control:  good  Exercise was discussed: yes  Taking supplements:  yes  Discussed journaling food:  yes  Patient is pleased with the current results:  yes  The patient is following the nutrition plan:  yes  Barriers to losing weight:  Behavior:  social calories    Patient Active Problem List   Diagnosis     Anal Fissure     Overweight     Vitamin D Deficiency       Current Outpatient Prescriptions on File Prior to Visit   Medication Sig Dispense Refill      polyethylene glycol (MIRALAX) 17 gram packet Take 1 packet by mouth.       [DISCONTINUED] phentermine (ADIPEX-P) 37.5 mg tablet Take 0.5 tablets (18.75 mg total) by mouth 2 (two) times a day. 50 tablet 0     acetaminophen (TYLENOL) 325 MG tablet Take by mouth.       ibuprofen (ADVIL,MOTRIN) 200 MG tablet Take 200-600 mg by mouth.       sulfacetamide-prednisoLONE (VASOCIDIN) 10 %-0.23 % (0.25 %) ophthalmic solution 5 drops once daily. Every other day; IN RIGHT EAR  0     No current facility-administered medications on file prior to visit.        The following portions of the patient's history were reviewed and updated as appropriate: allergies, current medications, past family history, past medical history, past social history, past surgical history and problem list.    Review of Systems  A 12 point comprehensive review of systems was negative except as noted.        OBJECTIVE:  Vitals:    04/28/17 0737   BP: 100/70   Pulse: 72     Initial Weight: 150 lbs  Weight: 141 lb 1.6 oz (64 kg)  Weight loss from initial: 8.9  % Weight loss: 5.93 %  Body mass index is 24.6 kg/(m^2).  General:  Patient is alert, pleasant, no distress.  Patient is obese.       .  This note has been dictated using voice recognition software. Any grammatical or context distortions are unintentional and inherent to the software

## 2021-06-12 NOTE — TELEPHONE ENCOUNTER
Checked on status of request, per rep determination may take up to 5 business days even if marked urgent. Will check later in the week for status update if no determination has been sent.

## 2021-06-12 NOTE — PROGRESS NOTES
"    New Medical Weight Management Consult    PATIENT:  Aicha Batista  MRN:         [id number]  :         1975  NAIF:         10/29/2020    Dear Dr. Up,    I had the pleasure of seeing your patient, Aicha Batista.  Full intake/assessment was done to determine barriers to weight loss success and develop a treatment plan. Aicha Batista is a 44 y.o. female interested in treatment of medical problems associated with weight.     Vitals:    10/29/20 0904   Weight: 155 lb (70.3 kg)   Height: 5' 3.5\" (1.613 m)     Body mass index is 27.03 kg/m .      ASSESSMENT / PLAN:    Overweight  This patient has participated in this medical weight loss program for a number of years.  I am meeting her for the first time today.  She is been on phentermine on and off at varying doses since  with decreasing efficacy.  When she was taking the medicine, she did take it on a regular basis.  She said that very early on she felt an effect of decreased appetite and associated weight loss.  Subsequently, she is not sure that it is helped her at all although she notes that she has gained some weight over the past 4 months or so since she went off of it definitively.    She eats a diet that is best described as Pesco-Mediterranean without added sugar/processed refined carbohydrates.  She does not describe overeating.  Sleep is adequate.  Stress is relatively low.  She works out on a regular basis.    The patient and I reviewed her body composition analysis from  in comparison to 2016 and today.  Even though her weight is up today in comparison to previous measurements her percent body fat is relatively low at 28.7%.  When she started the program in , her percent body fat was 33%.  Much of her excess weight is from her waist downward.  She does not have central adiposity.  There is not evidence of metabolic syndrome with her laboratory testing.    She is a recent diagnosis of Hashimoto's thyroiditis despite normal " "range TSH.  I suspect that her symptoms have been consistent with hypothyroidism over the years and I think it may be reasonable to focus on thyroid health.  The patient states that she is going to follow through the integrative medicine approach from the [facility].    I discussed implementing a time restricted eating strategy of varying intensities.  I believe she would be able to restrict calories and doing so as well as resolve any lingering insulin resistance.  I directed her to resources consistent with the recent [facility] review of Pesco-Mediterranean eating with intermittent fasting which is pretty close to how she is eating right now.    There is no absolute contraindication to anorexigenic medications.  They have not been particularly helpful in the past will defer for now.  She may benefit from a trial of liraglutide.  I think she may benefit more from trial of [place] Thyroid.  She will contact clinic if she wants to have this managed through our location.       No orders of the defined types were placed in this encounter.    SUBJECTIVE:    She has the following co-morbidities:    [facility] SURGERY CO-MORBIDITIES 10/26/2020   How has your weight changed over the last year?  Gained   How many pounds? 6   I have the following health issues associated with obesity: None of the above   I have the following symptoms associated with obesity: None of the above       Narrative History:   - \"what I am doing isn't working.\"   - previously on phentermine.  She has not been on it for the past 4 months. She noticed a difference coming off the medication.     - she had blood work with her women's health visit.  She was told that she has hashimotos. She had been feeling run down.  Sleep has been variable. \"Brain fog.\"     - exercise: 5x/week.     - she had a functional medicine assessment.    - the patient eats fish, veggies.  She does not eat poultry, red meat.  She does eat eggs. Lots of veggies.  Infrequent veggies.  Some " "starchy carbs.  She snacks on cheese and grapes.  Cheese is a guilty \"pleasure.\" She tries to avoid gluten for the most part.  Most days.   - she seens integrated medicine.  She ahs seen integrative medicine.   - breath test from [facility].  \"Fructose intolerance.\"    - she has experimented with fasting in the past.  \"It didn't make a difference.\"     Patient goals reviewed with patient 10/26/2020   I am interested in having a healthier weight to diminish current health problems: No   I am interested in having a healthier weight in order to prevent future health problems: Yes   I am interested in having a healthier weight in order to have a future surgery: No   I have the following family history of obesity/being overweight:  Many of my relatives are overweight   I have the following family history of obesity/being overweight:  Many of my relatives are overweight       Referring Provider 10/26/2020   Please name the provider who referred you to Medical Weight Management.  If you do not know, please answer: \"I Don't Know\". Dr. Up        Weight History Reviewed With Patient 10/26/2020   How concerned are you about your weight? Very Concerned   Would you describe your weight gain as gradual? Yes   I became overweight: As a Teenager   The following factors have contributed to my weight gain:  Eating Wrong Types of Food, Lack of Exercise, Genetic (Runs in the Family)   Please list the medications you have tried. phentermine   My lowest weight since age 19 was: 125   My highest weight since age 19 was: 160   The most weight I have ever lost was: (lbs) 20       Diet Recall Reviewed With Patient 10/26/2020   Do you typically eat breakfast? No   Do you typically eat lunch? Yes   If you do eat lunch, what types of food do you typically eat?  veggies, hummus, cheese, crackers   Do you typically eat supper? Yes   If you do eat supper, what types of food do you typically eat? veggies, fish, potatoes   Do you typically eat snacks? " Yes   If you do snack, what types of food do you typically eat? cheese, crackers, chips   Do you like vegetables?  Yes   Do you drink water?  Yes   How many glasses of juice do you drink in a typical day? 0   How many of glasses of milk do you drink in a typical day? 0   If you do drink milk, what type? N/A   How many 8oz glasses of sugar containing drinks such as Billy-Aid/sweet tea do you drink in a day? 0   How many cans/bottles of sugar pop/soda/tea/sports drinks do you drink in a day? 0   How many cans/bottles of sugar pop/soda/tea/sports drinks do you drink in a day? 0   How often do you have a drink of alcohol? 2-3 Times a Week   If you do drink, how many drinks might you have in a day? 1 or 2       Eating Habits Reviewed With Patient 10/26/2020   Eats Starches A Few Times a Week   Generally, my meals include foods like these: fried meats, brats, burgers, french fries, pizza, cheese, chips, or ice cream. Once a Week   Eat fast food (like McDonal[employer], [employer], Taco Bell). Never   Buffet Once a Week   Watches TV Less Than Weekly   Often skip meals, eat at random times, have no regular eating times. A Few Times a Week   Grazes A Few Times a Week   Eat extra snacks between meals. Less Than Weekly   Eat most of my food at the end of the day. A Few Times a Week   Eats at Night Never   Eat extra snacks to prevent or correct low blood sugar. Never   Eat to prevent acid reflux or stomach pain. Never   Worry about not having enough food to eat. Never   Have you been to the food shelf at least a few times this year? No   I eat when I am depressed. Never   I eat when I am stressed. Never   I eat when I am bored. Once a Week   I eat when I am anxious. Never   I eat when I am happy or as a reward. Never   I feel hungry all the time even if I just have eaten. Less Than Weekly   Feeling full is important to me. Never   I finish all the food on my plate even if I am already full. Never   I can't resist eating delicious food  or walk past the good food/smell. Never   I eat/snack without noticing that I am eating. Never   I eat when I am preparing the meal. Less Than Weekly   I eat more than usual when I see others eating. Never   I have trouble not eating sweets, ice cream, cookies, or chips if they are around the house. Once a Week   I think about food all day. Never   What foods, if any, do you crave? Chips / Crackers   Please list any other foods you crave. salty foods       Activity/Exercise History Reviewed With Patient 10/26/2020   How much of a typical 12 hour day do you spend sitting? Most of the Day   How much of a typical 12 hour day do you spend lying down? Less Than Half the Day   How much of a typical day do you spend walking/standing? Less Than Half the Day   How many hours (not including work) do you spend on the TV/Video Games/Computer/Tablet/Phone? 2-3 Hours   How many times a week are you active for the purpose of exercise? 4-5 Times a Week   How many total minutes do you spend doing some activity for the purpose of exercising when you exercise? More Than 30 Minutes   What keeps you from being more active? Lack of Time       PAST MEDICAL HISTORY:  Past Medical History:   Diagnosis Date     Anal fissure      Overweight      Vitamin D deficiency        Work/Social History Reviewed With Patient 10/26/2020   My employment status is: Full-Time   My job is:    How much of your job is spent on the computer or phone? 50%   How many hours do you spend commuting to work daily?  25   What is your marital status?  / In a Relationship   If in a relationship, is your significant other overweight? No   Do you have children? Yes   If you have children, are they overweight? No   Who do you live with?   and 2 sons   Are they supportive of your health goals? Yes   Who does the food shopping?  myself       Mental Health History Reviewed With Patient 10/26/2020   Have you ever been physically or sexually abused?  No   If yes, do you feel that the abuse is affecting your weight? N/A   If yes, would you like to talk to a counselor about the abuse? N/A   How often in the past 2 weeks have you felt little interest or pleasure in doing things? Not at all   Over the past 2 weeks how often have you felt down, depressed, or hopeless? Not at all       Sleep History Reviewed With Patient 10/26/2020   How many hours do you sleep at night? 7 (goal is 8 hours)  She wakes with an alarm clocks some days.  Children at 8 and 12 years old.    Has anyone seen or heard you stop breathing during your sleep? No   Do you often feel tired, fatigued, or sleepy during the day? Yes   Do you have a TV/Computer or other screens in your bedroom? Yes       MEDICATIONS:   No current outpatient medications on file.     No current facility-administered medications for this visit.        ALLERGIES:   Allergies   Allergen Reactions     Ampicillin Rash     Penicillins Rash       PHYSICAL EXAM:    Gen: Alert, pleasant, cooperative, no distress, appears stated age, patient is obese.  The patient hasgynoid body morphology.  Eyes: No conjunctival injection, no scleral icterus.  Neck: Supple, symmetrical, trachea midline.  No adenopathy.  Thyroid is without enlargement/tenderness/nodules.  Cardiac: Regular rate and rhythm, normal S1/S2, no murmurs or gallops, no edema at ankles bilaterally.  Respiratory: Clear to auscultation bilaterally.  Abdomen: Soft, nontender, no hepatosplenomegaly.  Extremities: Warm, well-perfused, no edema.     Skin: Skin, turgor, texture color is normal. Skin tags: No, striae: No, hirsutism: no, acanthosis nigricans: No.  Neurologic: Cranial nerves II through XII grossly intact.  2+ reflexes at the patella bilaterally.  Psych: Normal affect.  Normal rate of speech.  No tangentiality.      FOLLOW-UP: To be determined    TIME: 60 min spent on evaluation, management, counseling, education, & motivational interviewing with greater than 50 % of  the total time was spent on counseling and coordinating care    Sincerely,    Rubens Baez MD

## 2021-06-12 NOTE — TELEPHONE ENCOUNTER
Central PA team  [phone]  Pool: HE PA MED ([id number])          PA has been initiated.       PA form completed and faxed insurance via Cover My Meds     Key:  [id number]     Medication:  PHENTERMINE 15MG    Insurance:  SHELBI        Response will be received via fax and may take up to 5-10 business days depending on plan

## 2021-06-12 NOTE — PROGRESS NOTES
Assessment:     1. Overweight  phentermine (ADIPEX-P) 37.5 mg tablet    docusate sodium (COLACE) 100 MG capsule   2. Constipation, unspecified constipation type         Return in about 6 weeks (around 9/18/2017) for WLP.    Plan:     Constipation  Recommended starting a tablespoon of flaxseed meal daily.  Also increasing water intake.  Also the use of Colace twice a day as needed.    Overweight  For the next 1-3 months will continue patient on phentermine and half a tab daily.  Discussed food journaling as well as cutting calories back to 1400 to bring weight back down to 135.  Exercise discussed.  Also behavioral adaptation as she enters in 2 the football season and to help watch the snacking during those time frames and how to help combat the mindless eating that occurs with those foods in her house.      Subjective:       41 y.o. female presents for evaluation weight loss clinic.  Patient is asked to follow-up this time the year as previously is moving into football season, with what her family does during football season, she tends to gain weight.  She then is able to recover and bring that back down.  Since her last visit she has been running 3-4 times a week at least 3-4 miles a day.  She is having good energy.  However the last 5 pounds seem to have crept back on.  She is keeping her diet around 1500 dinesh a day but admits that she is eating cereal in the morning to help out with her constipation.  She is taking fiber one.  And she has stopped eating eggs in the morning.  She is having good energy through the day.    The following portions of the patient's history were reviewed and updated as appropriate: allergies, current medications, past family history, past medical history, past social history, past surgical history and problem list.    Review of Systems  A 12 point comprehensive review of systems was negative except as noted.     History   Smoking Status     Former Smoker     Start date: 10/10/2007  "  Smokeless Tobacco     Never Used       Objective:      /64 (Patient Site: Left Arm, Patient Position: Sitting, Cuff Size: Adult Large)  Pulse 64  Temp 97.8  F (36.6  C) (Oral)   Resp 12  Ht 5' 3.5\" (1.613 m)  Wt 140 lb 11.2 oz (63.8 kg)  LMP 08/07/2017 (Exact Date)  Breastfeeding? No  BMI 24.53 kg/m2  General appearance: alert, appears stated age and cooperative  Head: Normocephalic, without obvious abnormality, atraumatic  Lungs: clear to auscultation bilaterally  Heart: regular rate and rhythm, S1, S2 normal, no murmur, click, rub or gallop  Extremities: extremities normal, atraumatic, no cyanosis or edema       This note has been dictated using voice recognition software. Any grammatical or context distortions are unintentional and inherent to the software  "

## 2021-06-12 NOTE — TELEPHONE ENCOUNTER
Prior Authorization Request  Who s requesting:  Pharmacy  Pharmacy Name and Location: [facility]  Medication Name:    Disp Refills Start End    phentermine 15 MG capsule 30 capsule 0 10/30/2020     Sig - Route: Take 1 capsule (15 mg total) by mouth every morning. - Oral    Sent to pharmacy as: phentermine 15 mg capsule    E-Prescribing Status: Receipt confirmed by pharmacy (10/30/2020  6:48 AM CDT)        Insurance Plan:   Insurance Member ID Number:    CoverMyMeds Key: [id number]  Informed patient that prior authorizations can take up to 10 business days for response:   NA  Okay to leave a detailed message: Yes

## 2021-06-12 NOTE — PATIENT INSTRUCTIONS - HE
[email or web address].jacc.org/doi/full/10.1016/j.jacc.[email or web address] - Pes[place]-Mediterrean Intermittent Fasting.    Dr. Asa Pena MD   - The Obesity Code   - YouTube    Dr. Britany Johnson DO.   Search for her name in Youtube with added criteria: Mtaeo Wilson

## 2021-06-12 NOTE — TELEPHONE ENCOUNTER
Left message to call back for: Aicha   Information to relay to patient:  Pt needs to fill out forms prior to her appointment tomorrow for her intake appointment    Jocelin Pino LPN

## 2021-06-13 NOTE — PROGRESS NOTES
PLAN  See below, continue current plan    Overweight  We will move back into transitional phase by reducing half tabs 3 days a week.  Discussed avenues for controlling cravings as well as food prep.  Of note in the last transition patient actually had a very difficult time with a rapid movement and thus the plan to slowly move through transition.  Follow-up in clinic in 2 months.        Initial Weight: 150 lbs  Weight: 140 lb (63.5 kg)  Weight loss from initial: 10  % Weight loss: 6.67 %    Are you experiencing any side effects to the medications:  No  Hunger control:  good  Exercise was discussed: yes  Taking supplements:  yes  Discussed journaling food:  yes  Patient is pleased with the current results:  yes  The patient is following the nutrition plan:  yes  Barriers to losing weight:  Behavior:  social calories    Vitals:    09/22/17 1306   BP: 104/68   Pulse: 64   Resp: 12   Temp: 98.3  F (36.8  C)     General: Alert and oriented ×3  Heart: Regular rate and rhythm without murmur or gallop  Lungs: Clear to auscultation bilateral  Extremities: No cyanosis or edema

## 2021-06-13 NOTE — TELEPHONE ENCOUNTER
Controlled Substance Refill Request  Medication Name:   Requested Prescriptions     Pending Prescriptions Disp Refills     phentermine 15 MG capsule 30 capsule 0     Sig: Take 1 capsule (15 mg total) by mouth every morning.     Date Last Fill: 12/18/20  Requested Pharmacy: [facility]  Submit electronically to pharmacy  Controlled Substance Agreement on file:   Encounter-Level CSA Scan Date:    There are no encounter-level csa scan date.        Last office visit:  10/29/20  Harriet Juan RN, MA  [facility]    Triage Nurse Advisor

## 2021-06-15 NOTE — PROGRESS NOTES
PLAN  Step 2:  Meds one day on one day off.  Return to the clinic in two months.    Overweight  Holding stable. With the weather and sporting events, will continue in slow transition phase with every other day.         Initial Weight: 150 lbs  Weight: 141 lb 6.4 oz (64.1 kg)  Weight loss from initial: 8.6  % Weight loss: 5.73 %    Are you experiencing any side effects to the medications:  No  Hunger control:  good  Exercise was discussed: yes  Taking supplements:  yes  Discussed journaling food:  yes  Patient is pleased with the current results:  yes  The patient is following the nutrition plan:  yes  Barriers to losing weight:  Behavior:  Amnesia Calories:   stress (comfort)    Vitals:    01/08/18 0736   BP: 110/76   Pulse: 64   Resp: 12   Temp: 98.3  F (36.8  C)     General: Alert and oriented ×3  Heart: Regular rate and rhythm without murmur or gallop  Lungs: Clear to auscultation bilateral  Extremities: No cyanosis or edema

## 2021-06-17 NOTE — TELEPHONE ENCOUNTER
Telephone Encounter by Annie Green at 11/4/2020  4:02 PM     Author: Annie Green Service: -- Author Type: --    Filed: 11/4/2020  4:03 PM Encounter Date: 10/31/2020 Status: Signed    : Annie Green       PRIOR AUTHORIZATION DENIED    Denial Rational: PA for Phentermine 37.5mg  was DENIED     Denial Reason: patient does not meet the required criteria. Patient must meet #1 or #2  1. Calculated BMI of 30kg/m2 or greater  2. Calculated BMI of 27 kg/m2 or greater AND a history of HTN, Type 2 Diabetes, Dyslipidemia, Sleep Apnea, or Coronary Heart Disease           Appeal Information: This medication was denied. If physician would like to appeal because patient has contraindication or allergy to covered medication please write letter of medical necessity and route back to PA team to initiate.  If no further action is needed please close encounter thank you.

## 2021-06-18 NOTE — PROGRESS NOTES
PLAN  Follow up in 1 month.  Continue supplements.  This month concentrate on journaling    Overweight  Return back to phentermine daily.  Discussed appropriate food selections.  Also encouraged to watch the documentary in [employer].  Follow-up in 6 weeks.  At that time will look at transitioning.    Anemia, B12 deficiency  Patient has started taking a B12 supplement.  Will plan to recheck levels in 6 weeks.  If not above 300 by that time, then will consider moving over to IM injection for 6 months.        Initial Weight: 150 lbs  Weight: 145 lb (65.8 kg)  Weight loss from initial: 5  % Weight loss: 3.33 %    Are you experiencing any side effects to the medications:  No  Hunger control:  good  Exercise was discussed: yes  Taking supplements:  yes  Discussed journaling food:  yes  Patient is pleased with the current results:  no  The patient is following the nutrition plan:  yes  Barriers to losing weight:  Behavior:  social calories    Vitals:    06/21/18 0843   BP: 108/74   Pulse: 64   Resp: 12   Temp: 97.8  F (36.6  C)     General: Alert and oriented ×3  Heart: Regular rate and rhythm without murmur or gallop  Lungs: Clear to auscultation bilateral  Extremities: No cyanosis or edema

## 2021-06-20 NOTE — PROGRESS NOTES
PLAN  Continue medications.  This month concentrate on journaling  Return in about 6 weeks (around 10/10/2018) for WLP.    Overweight  Improved food prep and exercise routine. Continue current plan. Look at transition once back to under 140.    Anemia, B12 deficiency  Recheck levels and adjust supplementation as needed.        Initial Weight: 150 lbs  Weight: 142 lb 1.6 oz (64.5 kg)  Weight loss from initial: 7.9  % Weight loss: 5.27 %    Are you experiencing any side effects to the medications:  No  Hunger control:  good  Exercise was discussed: yes  Taking supplements:  yes  Discussed journaling food:  yes  Patient is pleased with the current results:  yes  The patient is following the nutrition plan:  yes  Barriers to losing weight:  Behavior:  social calories    Vitals:    08/29/18 0811   BP: 118/64   Pulse: 64   Resp: 12   Temp: 98.4  F (36.9  C)     General: Alert and oriented ×3  Heart: Regular rate and rhythm without murmur or gallop  Lungs: Clear to auscultation bilateral  Extremities: No cyanosis or edema

## 2021-06-21 NOTE — PROGRESS NOTES
PLAN  Continue medications.  This month concentrate on increasing exercise    Overweight  Continue to journal, work on long-term food prep, continue exercise regimen and current medications.  Follow-up in 6-8 weeks.        Initial Weight: 150 lbs  Weight: 144 lb 8 oz (65.5 kg)  Weight loss from initial: 5.5  % Weight loss: 3.67 %    Are you experiencing any side effects to the medications:  No  Hunger control:  good  Exercise was discussed: yes  Taking supplements:  n/a  Discussed journaling food:  yes, was not able to do so this past month.  There were some things that happened in the family.  She did keep up on her exercise but admits she did not journal and did not watch her intake.  Patient is pleased with the current results:  n/a  The patient is following the nutrition plan:  yes  Barriers to losing weight:  Behavior:  Amnesia Calories:   stress (comfort)    Vitals:    11/05/18 1616   BP: 110/64   Pulse: 64   Resp: 12   Temp: 98.4  F (36.9  C)     General: Alert and oriented ×3  Heart: Regular rate and rhythm without murmur or gallop  Lungs: Clear to auscultation bilateral  Extremities: No cyanosis or edema

## 2021-06-22 NOTE — PROGRESS NOTES
PLAN  Continue medications.  This month concentrate on journaling    Overweight  Continue current twice a day.  Follow-up in 2 months.  If not yet below ideal weight, will get in body as well as look at adding topiramate.        Initial Weight: 150 lbs  Weight: 145 lb 8 oz (66 kg)  Weight loss from initial: 4.5  % Weight loss: 3 %      Are you experiencing any side effects to the medications:  No  Hunger control:  good  Exercise was discussed: yes  Taking supplements:  yes  Discussed journaling food:  yes  Patient is pleased with the current results:  yes  The patient is following the nutrition plan:  yes  Barriers to losing weight:  Behavior:  social calories    Vitals:    12/31/18 0913   BP: 116/72   Pulse: 64   Resp: 12   Temp: 98.4  F (36.9  C)     General: Alert and oriented ×3  Heart: Regular rate and rhythm without murmur or gallop  Lungs: Clear to auscultation bilateral  Extremities: No cyanosis or edema

## 2021-06-23 NOTE — PROGRESS NOTES
PLAN  Plan to set a goal weight at 140.  See below    Overweight  Will continue phentermine this month.  Will also change goal to maintaining weight at 140 pound average.  Also talked about how to bring back in foods that she has been craving and in a more appropriate manner.  Continue exercise.        Initial Weight: 150 lbs  Weight: 143 lb 11.2 oz (65.2 kg)  Weight loss from initial: 6.3  % Weight loss: 4.2 %      Are you experiencing any side effects to the medications:  No  Hunger control:  good  Exercise was discussed: yes, patient actually states she feels stronger than she ever has before.  Taking supplements:  n/a  Discussed journaling food:  yes  Patient is pleased with the current results:  no, patient for some reason is still focusing on 130 pounds.  We went through the past 2 years of weights that showed that she is actually never been below 133.  Also some fact that she started at 150 and the 10-12 pound weight loss maintenance is actually ideal for her.  Especially with the muscle mass she is placed on.  The patient is following the nutrition plan:  yes  Barriers to losing weight:  Behavior:  social calories    Vitals:    02/04/19 1459   BP: 110/74   Pulse: 64   Resp: 12   Temp: 98.4  F (36.9  C)     General: Alert and oriented ×3  Heart: Regular rate and rhythm without murmur or gallop  Lungs: Clear to auscultation bilateral  Extremities: No cyanosis or edema

## 2021-07-03 NOTE — ADDENDUM NOTE
Addendum Note by Rubens Lundy MD at 12/18/2020  1:00 PM     Author: Rubens Lundy MD Service: -- Author Type: Physician    Filed: 12/18/2020  1:00 PM Encounter Date: 10/29/2020 Status: Signed    : Rubens Lundy MD (Physician)    Addended by: RUBENS LUNDY on: 12/18/2020 01:00 PM        Modules accepted: Orders

## 2021-09-25 ENCOUNTER — HEALTH MAINTENANCE LETTER (OUTPATIENT)
Age: 46
End: 2021-09-25

## 2021-10-21 NOTE — PATIENT INSTRUCTIONS
1. You were seen in the ENT Clinic today by Dr. Gonsalez.  If you have any questions or concerns after your appointment, please call   - Option 1: ENT Clinic: [phone]   - Option 2: Bharti (Dr. Gonsalez's Nurse): [phone]                   Erika(Dr. Gonsalez's Nurse): [phone]    2.   Plan to return to clinic in 4- 6 weeks without hearing test    3. Right ear culture- will call with the results    Bharti Tipton LPN  [facility] - Otolaryngology

## 2021-10-28 ENCOUNTER — OFFICE VISIT (OUTPATIENT)
Dept: OTOLARYNGOLOGY | Facility: CLINIC | Age: 46
End: 2021-10-28
Payer: COMMERCIAL

## 2021-10-28 VITALS
TEMPERATURE: 98.8 F | BODY MASS INDEX: 27.11 KG/M2 | HEIGHT: 63 IN | WEIGHT: 153 LBS | HEART RATE: 73 BPM | OXYGEN SATURATION: 99 %

## 2021-10-28 DIAGNOSIS — H92.11 OTORRHEA, RIGHT: Primary | ICD-10-CM

## 2021-10-28 PROCEDURE — 87186 SC STD MICRODIL/AGAR DIL: CPT | Mod: 90 | Performed by: PATHOLOGY

## 2021-10-28 PROCEDURE — 87102 FUNGUS ISOLATION CULTURE: CPT | Mod: 90 | Performed by: PATHOLOGY

## 2021-10-28 PROCEDURE — 87070 CULTURE OTHR SPECIMN AEROBIC: CPT | Mod: 90 | Performed by: PATHOLOGY

## 2021-10-28 PROCEDURE — 99212 OFFICE O/P EST SF 10 MIN: CPT | Mod: 25 | Performed by: OTOLARYNGOLOGY

## 2021-10-28 PROCEDURE — 87077 CULTURE AEROBIC IDENTIFY: CPT | Mod: 90 | Performed by: PATHOLOGY

## 2021-10-28 PROCEDURE — 92504 EAR MICROSCOPY EXAMINATION: CPT | Performed by: OTOLARYNGOLOGY

## 2021-10-28 ASSESSMENT — MIFFLIN-ST. JEOR: SCORE: 1313.13

## 2021-10-28 ASSESSMENT — PAIN SCALES - GENERAL: PAINLEVEL: NO PAIN (0)

## 2021-10-28 NOTE — Clinical Note
"10/28/2021       RE: Aicha Batista  [address]  [facility] [address]     Dear Colleague,    Thank you for referring your patient, Aicha Batista, to the [facility] EAR NOSE AND THROAT CLINIC [place] at [facility]. Please see a copy of my visit note below.      Otolaryngology Clinic      Name: Aicha Batitsa  MRN: [id number]  Age: 45 year old  : 1975  10/28/2021      Chief Complaint:   Follow up    History of Present Illness:   Aicha Batista is a 45 year old female with a history of right canal wall up mastoidectomy () followed by radical mastoidectomy for cholesteatoma who presents for routine ear cleaning. Patient was last seen in 10/26/2020. Patient reports drainage about once a month; also reports some worsening of hearing overall. No vertigo or tinnitus.     Physical Exam:   Pulse 73   Temp 98.8  F (37.1  C)   Ht 1.6 m (5' 3\")   Wt 69.4 kg (153 lb)   SpO2 99%   BMI 27.10 kg/m       Physical examination:  Female in no acute distress.  Alert and answering questions appropriately.  HB 1/6 bilaterally.  Right mastoid cavity was examined under the microscope. Posterior mastoid cavity with a strip of boomerang shaped mucosalized tissue on the floor but without significant granulation tissue; there is overlying yellow-green fluid and crusting that was submitted for culture and subsequently suctioned out, the remainder of the cavity remains well epithelialized and dry.     Assessment and Plan:  Aicha Batista is a 45 year old female with a history of right canal wall up mastoidectomy () followed by radical mastoidectomy for cholesteatoma who presents for routine ear cleaning. Patient has monthly episodes of right otorrhea and examination today shows yellow-green crusting and fluid in the mastoid bowl that was submitted for culture. We will see her back in 4-6 weeks for repeat examination.     Follow-up: No follow-ups on file. "     Patient was seen with attending physician, Dr. Gonsalez.     Ramon Contreras MD  Otolaryngology - Head and Neck Surgery, PGY-2        Scribe Disclosure:  I, Lori Mittal, am serving as a scribe to document services personally performed by Ana Gonsalez MD at this visit, based upon the provider's statements to me. All documentation has been reviewed by the aforementioned provider prior to being entered into the official medical record.       Again, thank you for allowing me to participate in the care of your patient.      Sincerely,    Ana Gonsalez MD

## 2021-10-28 NOTE — LETTER
"10/28/2021      [facility]ista  [address] [facility] [address]         Otolaryngology Clinic      Name: Aicha Batista  MRN: [id number]  Age: 45 year old  : 1975  10/28/2021      Chief Complaint:   Follow up    History of Present Illness:   Aicha Batista is a 45 year old female with a history of right canal wall up mastoidectomy () followed by radical mastoidectomy for cholesteatoma who presents for routine ear cleaning. Patient was last seen in 10/26/2020. Patient reports drainage about once a month; also reports some worsening of hearing overall. No vertigo or tinnitus.     Physical Exam:   Pulse 73   Temp 98.8  F (37.1  C)   Ht 1.6 m (5' 3\")   Wt 69.4 kg (153 lb)   SpO2 99%   BMI 27.10 kg/m       Female in no acute distress.  Alert and answering questions appropriately.  HB 1/6 bilaterally.  Right mastoid cavity was examined under the microscope. Posterior mastoid cavity with a strip of boomerang shaped mucosalized tissue on the floor but without significant granulation tissue; there is overlying yellow-green fluid and crusting that was submitted for culture and subsequently suctioned out, the remainder of the cavity remains well epithelialized and dry.     Assessment and Plan:  Aicha Batista is a 45 year old female with a history of right canal wall up mastoidectomy () followed by radical mastoidectomy for cholesteatoma who presents for routine ear cleaning. Patient has monthly episodes of right otorrhea and examination today shows yellow-green crusting and fluid in the mastoid bowl that was submitted for culture. We will see her back in 4-6 weeks for repeat examination.     Patient was seen with attending physician, Dr. Gonsalez.     Ramon Contreras MD  Otolaryngology - Head and Neck Surgery, PGY-2     Scribe Disclosure:  MARGO, Lori Mittal, am serving as a scribe to document services personally performed by Ana Gonsalez MD at this visit, based upon the provider's " statements to me. All documentation has been reviewed by the aforementioned provider prior to being entered into the official medical record.     I, Ana Gonsalez MD, saw this patient with the resident/fellow and agree with the resident/fellow s findings and plan of care as documented in the resident s/fellow s note. I was present for the entire procedure.    Ana Gonsalez MD    The documentation recorded by the scribe accurately reflects the services I personally performed and the decisions made by me.        Ana Gonsalez MD

## 2021-10-28 NOTE — PROGRESS NOTES
"  Otolaryngology Clinic      Name: Aicha Batista  MRN: [id number]  Age: 45 year old  : 1975  10/28/2021      Chief Complaint:   Follow up    History of Present Illness:   Aicha Batista is a 45 year old female with a history of right canal wall up mastoidectomy () followed by radical mastoidectomy for cholesteatoma who presents for routine ear cleaning. Patient was last seen in 10/26/2020. Patient reports drainage about once a month; also reports some worsening of hearing overall. No vertigo or tinnitus.     Physical Exam:   Pulse 73   Temp 98.8  F (37.1  C)   Ht 1.6 m (5' 3\")   Wt 69.4 kg (153 lb)   SpO2 99%   BMI 27.10 kg/m       Female in no acute distress.  Alert and answering questions appropriately.  HB 1/6 bilaterally.  Right mastoid cavity was examined under the microscope. Posterior mastoid cavity with a strip of boomerang shaped mucosalized tissue on the floor but without significant granulation tissue; there is overlying yellow-green fluid and crusting that was submitted for culture and subsequently suctioned out, the remainder of the cavity remains well epithelialized and dry.     Assessment and Plan:  Aicha Batista is a 45 year old female with a history of right canal wall up mastoidectomy () followed by radical mastoidectomy for cholesteatoma who presents for routine ear cleaning. Patient has monthly episodes of right otorrhea and examination today shows yellow-green crusting and fluid in the mastoid bowl that was submitted for culture. We will see her back in 4-6 weeks for repeat examination.     Patient was seen with attending physician, Dr. Gonsalez.     Ramon Contreras MD  Otolaryngology - Head and Neck Surgery, PGY-2     Scribe Disclosure:  I, Lori Mittal, am serving as a scribe to document services personally performed by Ana Gonsalez MD at this visit, based upon the provider's statements to me. All documentation has been reviewed by the aforementioned provider prior " to being entered into the official medical record.     I, Ana Gonsalez MD, saw this patient with the resident/fellow and agree with the resident/fellow s findings and plan of care as documented in the resident s/fellow s note. I was present for the entire procedure.    Ana Gonsalez MD    The documentation recorded by the scribe accurately reflects the services I personally performed and the decisions made by me.

## 2021-10-28 NOTE — NURSING NOTE
"Chief Complaint   Patient presents with     RECHECK     follow up      Pulse 73, temperature 98.8  F (37.1  C), height 1.6 m (5' 3\"), weight 69.4 kg (153 lb), SpO2 99 %, not currently breastfeeding.    Rasheed Pollock LPN    "

## 2021-11-03 DIAGNOSIS — H92.11 OTORRHEA, RIGHT: Primary | ICD-10-CM

## 2021-11-03 RX ORDER — CIPROFLOXACIN AND DEXAMETHASONE 3; 1 MG/ML; MG/ML
4 SUSPENSION/ DROPS AURICULAR (OTIC) 2 TIMES DAILY
Qty: 7.5 ML | Refills: 1 | Status: SHIPPED | OUTPATIENT
Start: 2021-11-03 | End: 2023-02-27

## 2021-11-03 NOTE — PROGRESS NOTES
Per Dr. Gonsalez- used Ciprodex until next follow up. Sent prescription to the pharmacy and notified pt by leaving detailed msg.     Bharti Tipton LPN

## 2021-11-04 LAB
BACTERIA SPEC CULT: ABNORMAL

## 2021-11-09 ENCOUNTER — TELEPHONE (OUTPATIENT)
Dept: OTOLARYNGOLOGY | Facility: CLINIC | Age: 46
End: 2021-11-09
Payer: COMMERCIAL

## 2021-11-09 DIAGNOSIS — A49.9 BACTERIAL INFECTION: Primary | ICD-10-CM

## 2021-11-09 NOTE — TELEPHONE ENCOUNTER
"\"Would you change her to gentamicin drops? One of the bacteria is resistant to cipro\" per Dr. Gonsalez    Left detailed msg for the pt. Drops were sent to the compound pharmacy, left phone number of pharmacy. Call back if any questions.    Bharti Tipton LPN    "

## 2021-11-20 ENCOUNTER — HEALTH MAINTENANCE LETTER (OUTPATIENT)
Age: 46
End: 2021-11-20

## 2021-11-25 LAB — BACTERIA SPEC CULT: NO GROWTH

## 2021-12-09 ENCOUNTER — OFFICE VISIT (OUTPATIENT)
Dept: OTOLARYNGOLOGY | Facility: CLINIC | Age: 46
End: 2021-12-09
Attending: OTOLARYNGOLOGY
Payer: COMMERCIAL

## 2021-12-09 VITALS — TEMPERATURE: 98.5 F | WEIGHT: 148.8 LBS | BODY MASS INDEX: 27.38 KG/M2 | HEIGHT: 62 IN

## 2021-12-09 DIAGNOSIS — H90.11 CONDUCTIVE HEARING LOSS OF RIGHT EAR WITH UNRESTRICTED HEARING OF LEFT EAR: Primary | ICD-10-CM

## 2021-12-09 PROCEDURE — 69220 CLEAN OUT MASTOID CAVITY: CPT | Performed by: OTOLARYNGOLOGY

## 2021-12-09 PROCEDURE — G0463 HOSPITAL OUTPT CLINIC VISIT: HCPCS

## 2021-12-09 ASSESSMENT — PAIN SCALES - GENERAL: PAINLEVEL: NO PAIN (0)

## 2021-12-09 ASSESSMENT — MIFFLIN-ST. JEOR: SCORE: 1280.82

## 2021-12-09 NOTE — LETTER
12/9/2021      [facility]ista  [address] [facility] [address]       Aicha Batista is seen for a right ear check. She was noted to have mucosalization and episodic otorrhea at the last visit. Cultures grew mostly skin organisms and she was placed on gentamicin drops. She reports her ear feels dry, hearing unchanged from last time.    Physical examination:  female in no acute distress.  Alert and answering questions appropriately.  HB 1/6 bilaterally.  Right ear examined under the microscope. Moderate amount of dried crust removed from the posterior inferior cavity using a right angle pick and alligator with a cholesteatoma within the debris which was not identified at the last visit, the floor of the mastoid cavity is clean and dry other than a small area of mucosalization on the anterior inferior edge of the sigmoid sinus which is fully uncovered.     Assessment and plan:  Improved appearance of the ear although with a piece of dry debris with cholesteatoma within it in the posterior inferior cavity. Nothing else noted. We will see her back in 6 months for a check instead of a year.        Ana Gonsalez MD

## 2021-12-09 NOTE — PROGRESS NOTES
Aicha Batista is seen for a right ear check. She was noted to have mucosalization and episodic otorrhea at the last visit. Cultures grew mostly skin organisms and she was placed on gentamicin drops. She reports her ear feels dry, hearing unchanged from last time.    Physical examination:  female in no acute distress.  Alert and answering questions appropriately.  HB 1/6 bilaterally.  Right ear examined under the microscope. Moderate amount of dried crust removed from the posterior inferior cavity using a right angle pick and alligator with a cholesteatoma within the debris which was not identified at the last visit, the floor of the mastoid cavity is clean and dry other than a small area of mucosalization on the anterior inferior edge of the sigmoid sinus which is fully uncovered.     Assessment and plan:  Improved appearance of the ear although with a piece of dry debris with cholesteatoma within it in the posterior inferior cavity. Nothing else noted. We will see her back in 6 months for a check instead of a year.

## 2021-12-09 NOTE — NURSING NOTE
"Chief Complaint   Patient presents with     Ent Problem     Patient here for ent follow up       Temp 98.5  F (36.9  C) (Temporal)   Ht 5' 2.48\" (158.7 cm)   Wt 148 lb 12.8 oz (67.5 kg)   BMI 26.80 kg/m      Lidya Glynn  "

## 2022-01-15 ENCOUNTER — HEALTH MAINTENANCE LETTER (OUTPATIENT)
Age: 47
End: 2022-01-15

## 2022-06-27 ENCOUNTER — OFFICE VISIT (OUTPATIENT)
Dept: OTOLARYNGOLOGY | Facility: CLINIC | Age: 47
End: 2022-06-27
Payer: COMMERCIAL

## 2022-06-27 VITALS
BODY MASS INDEX: 27.64 KG/M2 | WEIGHT: 156 LBS | TEMPERATURE: 97.7 F | HEIGHT: 63 IN | SYSTOLIC BLOOD PRESSURE: 120 MMHG | DIASTOLIC BLOOD PRESSURE: 60 MMHG

## 2022-06-27 DIAGNOSIS — H92.11 OTORRHEA, RIGHT: Primary | ICD-10-CM

## 2022-06-27 PROCEDURE — 99213 OFFICE O/P EST LOW 20 MIN: CPT | Mod: 25 | Performed by: OTOLARYNGOLOGY

## 2022-06-27 PROCEDURE — 87106 FUNGI IDENTIFICATION YEAST: CPT | Mod: 90 | Performed by: PATHOLOGY

## 2022-06-27 PROCEDURE — 92504 EAR MICROSCOPY EXAMINATION: CPT | Mod: GC | Performed by: OTOLARYNGOLOGY

## 2022-06-27 PROCEDURE — 99000 SPECIMEN HANDLING OFFICE-LAB: CPT | Performed by: PATHOLOGY

## 2022-06-27 PROCEDURE — 87070 CULTURE OTHR SPECIMN AEROBIC: CPT | Mod: 90 | Performed by: PATHOLOGY

## 2022-06-27 PROCEDURE — 87102 FUNGUS ISOLATION CULTURE: CPT | Mod: 90 | Performed by: PATHOLOGY

## 2022-06-27 ASSESSMENT — PAIN SCALES - GENERAL: PAINLEVEL: NO PAIN (0)

## 2022-06-27 NOTE — LETTER
6/27/2022       RE: Aicha Batista  [address] [facility] [address]     Dear Colleague,    Thank you for referring your patient, Aicha Batista, to the [facility] EAR NOSE AND THROAT CLINIC [place] at [facility]. Please see a copy of my visit note below.    Aicha Batista is a 46 year old female with a history of right canal wall up mastoidectomy (2014) followed by radical mastoidectomy for cholesteatoma who presents for routine ear cleaning. Patient was last seen in 12/2021. She has been doing well without any otalgia or otorrhea. Denies any changes in hearing. She used ear drops for several weeks after last visit but not since.      Physical Exam:   Female in no acute distress.  Alert and answering questions appropriately.  HB 1/6 bilaterally.    - Right: Mastoid cavity mostly dry, clean, well-epithelialized. There was a small area of mucosalization posterior to facial ridge. Small amount of crusts removed from posterior cavity. Culture swab obtained from the mucosalized area.   - Left: Ear canal with mild cerumen, debrided with alligator forceps. TM intact with small thin area centrally slightly retracted. Middle ear well aerated.     Assessment and Plan:  Aicha Batista is a 46 year old female with a history of right canal wall up mastoidectomy (2014) followed by radical mastoidectomy for cholesteatoma who presents for routine ear cleaning. Her right mastoid cavity is dry and appears stable from the last exam. Culture was obtained from a small area of mucosalization. We will follow up with culture results and start ear drops as needed.    Follow-up:   - follow up with culture results     Milly Lara MD MPH   Fellow Physician  Otology & Neurotology  [facility]      Ana ARAGON MD, saw this patient with the resident/fellow and agree with the resident/fellow s findings and plan of care as documented in the  resident s/fellow s note. I was present for the entire procedure.    Ana Gonsalez MD

## 2022-06-27 NOTE — PATIENT INSTRUCTIONS
1. You were seen in the ENT Clinic today by Dr. Gonsalez.  If you have any questions or concerns after your appointment, please call   - Option 1: ENT Clinic: [phone]   - Option 2: Bharti (Dr. Gonsalez's Nurse): [phone]                   Erika(Dr. Gonsalez's Nurse): [phone]    2.   Plan to return to clinic - depends on the ear culture    3. Right ear culture- will call with results    Bharti Tipton LPN  [facility] - Otolaryngology

## 2022-06-27 NOTE — NURSING NOTE
"Chief Complaint   Patient presents with     RECHECK     Follow up     Blood pressure 120/60, temperature 97.7  F (36.5  C), height 1.6 m (5' 3\"), weight 70.8 kg (156 lb), not currently breastfeeding.    Rasheed Pollock LPN    "

## 2022-06-27 NOTE — PROGRESS NOTES
Aicha Batista is a 46 year old female with a history of right canal wall up mastoidectomy (2014) followed by radical mastoidectomy for cholesteatoma who presents for routine ear cleaning. Patient was last seen in 12/2021. She has been doing well without any otalgia or otorrhea. Denies any changes in hearing. She used ear drops for several weeks after last visit but not since.      Physical Exam:   Female in no acute distress.  Alert and answering questions appropriately.  HB 1/6 bilaterally.    - Right: Mastoid cavity mostly dry, clean, well-epithelialized. There was a small area of mucosalization posterior to facial ridge. Small amount of crusts removed from posterior cavity. Culture swab obtained from the mucosalized area.   - Left: Ear canal with mild cerumen, debrided with alligator forceps. TM intact with small thin area centrally slightly retracted. Middle ear well aerated.     Assessment and Plan:  Aicha Batista is a 46 year old female with a history of right canal wall up mastoidectomy (2014) followed by radical mastoidectomy for cholesteatoma who presents for routine ear cleaning. Her right mastoid cavity is dry and appears stable from the last exam. Culture was obtained from a small area of mucosalization. We will follow up with culture results and start ear drops as needed.    Follow-up:   - follow up with culture results     Milly Lara MD MPH   Fellow Physician  Otology & Neurotology  [facility]      MARGO, Ana Gonsalez MD, saw this patient with the resident/fellow and agree with the resident/fellow s findings and plan of care as documented in the resident s/fellow s note. I was present for the entire procedure.    Ana Gonsalez MD

## 2022-06-30 ENCOUNTER — TELEPHONE (OUTPATIENT)
Dept: OTOLARYNGOLOGY | Facility: CLINIC | Age: 47
End: 2022-06-30

## 2022-06-30 DIAGNOSIS — B37.9 YEAST INFECTION: Primary | ICD-10-CM

## 2022-06-30 LAB — BACTERIA SPEC CULT: NORMAL

## 2022-06-30 RX ORDER — CLOTRIMAZOLE 1 G/ML
SOLUTION TOPICAL
Qty: 15 ML | Refills: 1 | Status: SHIPPED | OUTPATIENT
Start: 2022-06-30 | End: 2022-11-08

## 2022-06-30 NOTE — TELEPHONE ENCOUNTER
"\"She has yeast in her ear. Would you put her on clotrimazole 5 drops bid x 21 days? I should see her in a monthish. \" per Dr. Gonsalez    Left , CB number given.    Bharti Tipton LPN    "

## 2022-07-25 LAB — BACTERIA SPEC CULT: ABNORMAL

## 2022-07-26 NOTE — MR AVS SNAPSHOT
"              After Visit Summary   10/25/2018    Aicha Batista    MRN: [id number]           Patient Information     Date Of Birth          1975        Visit Information        Provider Department      10/25/2018 2:45 PM Ana Gonsalez MD M [facility]         Follow-ups after your visit        Your next 10 appointments already scheduled     Apr 25, 2019  2:45 PM CDT   (Arrive by 2:30 PM)   RETURN NEUROTOLOGY with MD JAMISON Trejo [facility] ([facility])    [address] [place] [id number] [phone]              Who to contact     Please call your clinic at [phone] to:    Ask questions about your health    Make or cancel appointments    Discuss your medicines    Learn about your test results    Speak to your doctor            Additional Information About Your Visit        MyChart Information     Idea Device gives you secure access to your electronic health record. If you see a primary care provider, you can also send messages to your care team and make appointments. If you have questions, please call your primary care clinic.  If you do not have a primary care provider, please call [phone] and they will assist you.      Idea Device is an electronic gateway that provides easy, online access to your medical records. With Idea Device, you can request a clinic appointment, read your test results, renew a prescription or communicate with your care team.     To access your existing account, please contact your [facility] or call [phone] for assistance.        Care EveryWhere ID     This is your Care EveryWhere ID. This could be used by other organizations to access your [place] medical records  [id number]        Your Vitals Were     Height BMI (Body Mass Index)                1.626 m (5' 4\") 24.89 kg/m2           Blood Pressure from Last 3 Encounters:   11/03/17 122/70   07/23/07 110/78 "   12/06/05 100/62    Weight from Last 3 Encounters:   10/25/18 65.8 kg (145 lb)   04/18/18 65.8 kg (145 lb)   11/09/17 65.3 kg (144 lb)              Today, you had the following     No orders found for display       Primary Care Provider Office Phone # Fax #    Jg Reynoso [phone]       [facility] [address]  [facility] [address]        Equal Access to Services     AUGUSTO ELLINGTON : Hadii aad ku hadasho Soomaali, waaxda luqadaha, qaybta kaalmada adeegyada, waxay idiin hayaan adeeg kharash la'jose angel perera. So [facility] [phone].    ATENCIÓN: Si habla español, tiene a hernandes disposición servicios gratuitos de asistencia lingüística. [facility] [phone].    We comply with applicable federal civil rights laws and [place] laws. We do not discriminate on the basis of race, color, national origin, age, disability, sex, sexual orientation, or gender identity.            Thank you!     Thank you for choosing [facility] E[facility] TH[facility]  for your care. Our goal is always to provide you with excellent care. Hearing back from our patients is one way we can continue to improve our services. Please take a few minutes to complete the written survey that you may receive in the mail after your visit with us. Thank you!             Your Updated Medication List - Protect others around you: Learn how to safely use, store and throw away your medicines at www.disposemymeds.org.          This list is accurate as of 10/25/18  3:23 PM.  Always use your most recent med list.                   Brand Name Dispense Instructions for use Diagnosis    polyethylene glycol Packet    MIRALAX/GLYCOLAX     Take 1 packet by mouth daily           There are no Wet Read(s) to document.

## 2022-09-22 ENCOUNTER — TELEPHONE (OUTPATIENT)
Dept: OTOLARYNGOLOGY | Facility: CLINIC | Age: 47
End: 2022-09-22

## 2022-09-22 NOTE — TELEPHONE ENCOUNTER
[facility] Call Center    Phone Message    May a detailed message be left on voicemail: yes     Reason for Call: Other: pt was supposed to get a call from clinic in regards to an appointment but never heard anything back please advise      Action Taken: Message routed to:  [facility] ENT    Travel Screening: Not Applicable

## 2022-09-22 NOTE — TELEPHONE ENCOUNTER
Scheduled pt to 10/27 as Dr. Gonsalez wants her to use the drops for 21 days before she sees her. Pt agreed to the plan. No further questions.    Bharti Tipton LPN

## 2022-09-22 NOTE — TELEPHONE ENCOUNTER
Pt never received a call to schedule her follow up appt and also never picked up her prescription. She said the pharmacy said there was an issue. Offered pt appt on 9/29 at 1:15 pm. Called pharmacy to find a what happened with her prescription. Pharmacy said it was not covered so faxed a copy of a good rx coupon.    Bharti Tipton LPN

## 2022-10-03 ENCOUNTER — TELEPHONE (OUTPATIENT)
Dept: OTOLARYNGOLOGY | Facility: CLINIC | Age: 47
End: 2022-10-03

## 2022-10-03 NOTE — TELEPHONE ENCOUNTER
LVM that Dr. Gonsalez  is no longer available for their appointment on 10/27  and we need to reschedule. Gave new date and time and call center number

## 2022-11-03 ENCOUNTER — OFFICE VISIT (OUTPATIENT)
Dept: OTOLARYNGOLOGY | Facility: CLINIC | Age: 47
End: 2022-11-03
Payer: COMMERCIAL

## 2022-11-03 VITALS — WEIGHT: 156 LBS | HEIGHT: 63 IN | BODY MASS INDEX: 27.64 KG/M2

## 2022-11-03 DIAGNOSIS — H90.11 CONDUCTIVE HEARING LOSS OF RIGHT EAR WITH UNRESTRICTED HEARING OF LEFT EAR: ICD-10-CM

## 2022-11-03 DIAGNOSIS — H92.11 OTORRHEA, RIGHT: Primary | ICD-10-CM

## 2022-11-03 PROCEDURE — 87070 CULTURE OTHR SPECIMN AEROBIC: CPT | Mod: 90 | Performed by: PATHOLOGY

## 2022-11-03 PROCEDURE — 87106 FUNGI IDENTIFICATION YEAST: CPT | Mod: 90 | Performed by: PATHOLOGY

## 2022-11-03 PROCEDURE — 87102 FUNGUS ISOLATION CULTURE: CPT | Mod: 90 | Performed by: PATHOLOGY

## 2022-11-03 PROCEDURE — 87186 SC STD MICRODIL/AGAR DIL: CPT | Mod: 90 | Performed by: PATHOLOGY

## 2022-11-03 PROCEDURE — 87077 CULTURE AEROBIC IDENTIFY: CPT | Mod: 90 | Performed by: PATHOLOGY

## 2022-11-03 PROCEDURE — 92504 EAR MICROSCOPY EXAMINATION: CPT | Mod: GC | Performed by: OTOLARYNGOLOGY

## 2022-11-03 PROCEDURE — 99000 SPECIMEN HANDLING OFFICE-LAB: CPT | Performed by: PATHOLOGY

## 2022-11-03 PROCEDURE — 99213 OFFICE O/P EST LOW 20 MIN: CPT | Mod: 25 | Performed by: OTOLARYNGOLOGY

## 2022-11-03 NOTE — PATIENT INSTRUCTIONS
You were seen in the ENT Clinic today by Dr. Gonsalez. If you have any questions or concerns after your appointment, please contact us (see below)      2.   Please return to clinic in 6 weeks  3. We will call with the results of your ear culture.         How to Contact Us:  Send a Blink Messenger message to your provider. Our team will respond to you via Blink Messenger. Occasionally, we will need to call you to get further information.  For urgent matters (Monday-Friday), call the ENT Clinic: [phone] and speak with a call center team member - they will route your call appropriately.   If you'd like to speak directly with a nurse, please find our contact information below. We do our best to check voicemail frequently throughout the day, and will work to call you back within 1-2 days. For urgent matters, please use the general clinic phone numbers listed above.      Lisandra VERAS RN  ENT RN Care Coordinator  Direct: [phone]    Bharti ONEILL LPN  Direct: [phone]

## 2022-11-03 NOTE — PROGRESS NOTES
"Chief Complaint   Patient presents with     Follow Up     Height 1.6 m (5' 3\"), weight 70.8 kg (156 lb), not currently breastfeeding.    Bharti Tipton LPN    "

## 2022-11-03 NOTE — LETTER
11/3/2022      RE: Aicha Batista  [address]  [facility] [address]       d  Otolaryngology Clinic      Name: Aicha Batista  MRN: [id number]  Age: 46 year old  : 1975      Chief Complaint:   Follow up    History of Present Illness:   Aicha Batista is a 46 year old female with a history of right canal wall up mastoidectomy in  followed by radical mastoidectomy by Dr. Torres at [place] in 2017 for cholesteatoma who presents for routine ear check. She has a history of a prolonged mastoid cavity infection which resolved with frequent debridement and antibiotic therapy. She was last seen on 22 for ear cleaning and had a stable exam.     Physical examination:  Female in no acute distress.  Alert and answering questions appropriately.  HB 1/6 bilaterally.  Bilateral ears examined under the microscope.  - Right: Mastoid cavity mostly dry, clean, well-epithelialized. There was a small area of mucosalization posterior to facial ridge. Small amount of crusts removed from posterior cavity. Culture swab obtained from the mucosalized area.   - Left: Ear canal with mild cerumen, debrided with alligator forceps. TM intact with small thin area centrally slightly retracted. Middle ear well aerated.      Assessment and Plan:  Aicha Batista is a 46 year old female presenting for right mastoid cavity check. She has an area of mucosalization that was cultured. We will call her with the culture results and get her on appropriate therapy as needed. The patient expressed understanding and is in agreement with this plan.  All questions were answered.      Milly Lara MD MPH   Fellow Physician  Otology & Neurotology  [facility]       Scribe Disclosure:  I, Christ Cuevas, am serving as a scribe to document services personally performed by Ana Gonsalez MD at this visit, based upon the provider's statements to me. All documentation has been reviewed by the aforementioned provider  "prior to being entered into the official medical record.     The documentation recorded by the scribe accurately reflects the services I personally performed and the decisions made by me.    I, Ana Gonsalez MD, saw this patient with the resident/fellow and agree with the resident/fellow s findings and plan of care as documented in the resident s/fellow s note. I was present for the entire procedure.    Ana Gonsalez MD      Chief Complaint   Patient presents with     Follow Up     Height 1.6 m (5' 3\"), weight 70.8 kg (156 lb), not currently breastfeeding.    BHAVIK Moyer MD    "

## 2022-11-03 NOTE — PROGRESS NOTES
[facility]  Otolaryngology Clinic      Name: Aicha Batista  MRN: [id number]  Age: 46 year old  : 1975      Chief Complaint:   Follow up    History of Present Illness:   Aicha Batista is a 46 year old female with a history of right canal wall up mastoidectomy in  followed by radical mastoidectomy by Dr. Torres at [place] in 2017 for cholesteatoma who presents for routine ear check. She has a history of a prolonged mastoid cavity infection which resolved with frequent debridement and antibiotic therapy. She was last seen on 22 for ear cleaning and had a stable exam.     Physical examination:  Female in no acute distress.  Alert and answering questions appropriately.  HB 1/6 bilaterally.  Bilateral ears examined under the microscope.  - Right: Mastoid cavity mostly dry, clean, well-epithelialized. There was a small area of mucosalization posterior to facial ridge. Small amount of crusts removed from posterior cavity. Culture swab obtained from the mucosalized area.   - Left: Ear canal with mild cerumen, debrided with alligator forceps. TM intact with small thin area centrally slightly retracted. Middle ear well aerated.      Assessment and Plan:  Aicha Batista is a 46 year old female presenting for right mastoid cavity check. She has an area of mucosalization that was cultured. We will call her with the culture results and get her on appropriate therapy as needed. The patient expressed understanding and is in agreement with this plan.  All questions were answered.      Milly Lara MD MPH   Fellow Physician  Otology & Neurotology  [facility]       Scribe Disclosure:  I, Christ Cuevas, am serving as a scribe to document services personally performed by Ana Gonsalez MD at this visit, based upon the provider's statements to me. All documentation has been reviewed by the aforementioned provider prior to being entered into the official medical record.     The documentation  recorded by the scribe accurately reflects the services I personally performed and the decisions made by me.    I, Ana Gonsalez MD, saw this patient with the resident/fellow and agree with the resident/fellow s findings and plan of care as documented in the resident s/fellow s note. I was present for the entire procedure.    Ana Gonsalez MD

## 2022-11-03 NOTE — LETTER
11/3/2022       RE: Aicha Batista  [address]  [facility] [address]     Dear Colleague,    Thank you for referring your patient, Aicha Batista, to the [facility]IC [place] at [facility]. Please see a copy of my visit note below.    d  Otolaryngology Clinic      Name: Aicha Batista  MRN: [id number]  Age: 46 year old  : 1975      Chief Complaint:   Follow up    History of Present Illness:   Aicha Batista is a 46 year old female with a history of right canal wall up mastoidectomy in  followed by radical mastoidectomy by Dr. Torres at [place] in 2017 for cholesteatoma who presents for routine ear check. She has a history of a prolonged mastoid cavity infection which resolved with frequent debridement and antibiotic therapy. She was last seen on 22 for ear cleaning and had a stable exam.     Physical examination:  Female in no acute distress.  Alert and answering questions appropriately.  HB 1/6 bilaterally.  Bilateral ears examined under the microscope.  - Right: Mastoid cavity mostly dry, clean, well-epithelialized. There was a small area of mucosalization posterior to facial ridge. Small amount of crusts removed from posterior cavity. Culture swab obtained from the mucosalized area.   - Left: Ear canal with mild cerumen, debrided with alligator forceps. TM intact with small thin area centrally slightly retracted. Middle ear well aerated.      Assessment and Plan:  Aicha Batista is a 46 year old female presenting for right mastoid cavity check. She has an area of mucosalization that was cultured. We will call her with the culture results and get her on appropriate therapy as needed. The patient expressed understanding and is in agreement with this plan.  All questions were answered.      Milly Lara MD MPH   Fellow Physician  Otology & Neurotology  [facility]       Beth  "Disclosure:  I, Christ Cuevas, am serving as a scribe to document services personally performed by Ana Gonsalez MD at this visit, based upon the provider's statements to me. All documentation has been reviewed by the aforementioned provider prior to being entered into the official medical record.     The documentation recorded by the scribe accurately reflects the services I personally performed and the decisions made by me.    I, Ana Gonsalez MD, saw this patient with the resident/fellow and agree with the resident/fellow s findings and plan of care as documented in the resident s/fellow s note. I was present for the entire procedure.    Ana Gonsalez MD      Chief Complaint   Patient presents with     Follow Up     Height 1.6 m (5' 3\"), weight 70.8 kg (156 lb), not currently breastfeeding.    Bharti Tipton LPN        Again, thank you for allowing me to participate in the care of your patient.      Sincerely,    Ana Gonsalez MD      "

## 2022-11-07 DIAGNOSIS — H92.11 OTORRHEA, RIGHT: Primary | ICD-10-CM

## 2022-11-07 LAB
BACTERIA SPEC CULT: ABNORMAL
BACTERIA SPEC CULT: ABNORMAL

## 2022-11-07 RX ORDER — SULFAMETHOXAZOLE/TRIMETHOPRIM 800-160 MG
1 TABLET ORAL 2 TIMES DAILY
Qty: 28 TABLET | Refills: 0 | Status: SHIPPED | OUTPATIENT
Start: 2022-11-07 | End: 2022-11-21

## 2022-11-07 RX ORDER — SULFACETAMIDE SODIUM AND PREDNISOLONE SODIUM PHOSPHATE 100; 2.3 MG/ML; MG/ML
5 SOLUTION/ DROPS OPHTHALMIC 2 TIMES DAILY
Qty: 10 ML | Refills: 1 | Status: SHIPPED | OUTPATIENT
Start: 2022-11-07 | End: 2023-02-27

## 2022-11-08 DIAGNOSIS — B37.9 YEAST INFECTION: ICD-10-CM

## 2022-11-08 RX ORDER — CLOTRIMAZOLE 1 G/ML
SOLUTION TOPICAL
Qty: 15 ML | Refills: 1 | Status: SHIPPED | OUTPATIENT
Start: 2022-11-08 | End: 2023-02-27

## 2022-11-08 RX ORDER — FLUCONAZOLE 100 MG/1
100 TABLET ORAL DAILY
Qty: 20 TABLET | Refills: 0 | Status: SHIPPED | OUTPATIENT
Start: 2022-11-08 | End: 2022-11-18

## 2022-12-01 LAB — BACTERIA SPEC CULT: ABNORMAL

## 2022-12-26 ENCOUNTER — HEALTH MAINTENANCE LETTER (OUTPATIENT)
Age: 47
End: 2022-12-26

## 2023-02-27 ENCOUNTER — OFFICE VISIT (OUTPATIENT)
Dept: FAMILY MEDICINE | Facility: CLINIC | Age: 48
End: 2023-02-27
Payer: COMMERCIAL

## 2023-02-27 VITALS
OXYGEN SATURATION: 98 % | DIASTOLIC BLOOD PRESSURE: 79 MMHG | SYSTOLIC BLOOD PRESSURE: 130 MMHG | HEART RATE: 75 BPM | WEIGHT: 154 LBS | HEIGHT: 63 IN | BODY MASS INDEX: 27.29 KG/M2

## 2023-02-27 DIAGNOSIS — E66.3 OVERWEIGHT: ICD-10-CM

## 2023-02-27 PROCEDURE — 99215 OFFICE O/P EST HI 40 MIN: CPT | Performed by: FAMILY MEDICINE

## 2023-02-27 NOTE — PROGRESS NOTES
"    New Medical Weight Management Consult    PATIENT:  Aicha Batista  MRN:         [id number]  :         1975  NAIF:         2023    Dear Dr. Reynoso,    I had the pleasure of seeing your patient, Aicha Batista. Full intake/assessment was done to determine barriers to weight loss success and develop a treatment plan. Aicha Batista is a 47 year old female interested in treatment of medical problems associated with excess weight. She has a height of 5' 3\", a weight of 154 lbs 0 oz, and the calculated Body mass index is 27.28 kg/m .    ASSESSMENT/PLAN:  Problem List Items Addressed This Visit        Other    Overweight     We had a good conversation today as it relates to weight management.  The patient's BMI is 27 and she is not a candidate for weight loss medication.  I reviewed the patient's approach to nutrition as well as exercise and she is actually doing a great job with both of those.  I reviewed the importance of a whole food versus processed food approach to nutrition and provided her with macronutrient recommendations.  I recommended logging her food intake over the next month and then following up and at that time we can look for more opportunities.  However, we also discussed that having a good relationship with food, overall feeling good about her body, and focusing on healthy nutrition and regular exercise as well as getting adequate sleep and stress management are the most important thing she can do for her health going forward.            She has the following co-morbidities:       2023   I have the following health issues associated with obesity: None of the above   I have the following symptoms associated with obesity: None of the above       Patient Goals 2023   I am interested in having a healthier weight to diminish current health problems: No   I am interested in having a healthier weight in order to prevent future health problems: Yes   I am interested in having " "a healthier weight in order to have a future surgery: No       Referring Provider 2/27/2023   Please name the provider who referred you to Medical Weight Management.  If you do not know, please answer: \"I Don't Know\". previously a patient       Weight History 2/27/2023   How concerned are you about your weight? Very Concerned   Would you describe your weight gain as gradual? Yes   I became overweight: As a Teenager   The following factors have contributed to my weight gain:  Change in Schedule, Other   Please list the other factors.  perimenopause   I have tried the following methods to lose weight: Watching Portions or Calories, Exercise, Weight Watchers, Fasting, Other   My lowest weight since age 19 was: 129   My highest weight since age 19 was: 160   The most weight I have ever lost was: (lbs) 25   I have the following family history of obesity/being overweight:  I am the only one in my immediate family who is overweight   Has anyone in your family had weight loss surgery? No   How has your weight changed over the last year?  Gained   How many pounds? 7       Diet Recall Review with Patient 2/27/2023   Do you typically eat breakfast? No   Do you typically eat lunch? Yes   If you do eat lunch, what types of food do you typically eat?  fruit yogurt, eggs   Do you typically eat supper? Yes   If you do eat supper, what types of food do you typically eat? veggies, chicken , fish, seafood   Do you typically eat snacks? No   If you do snack, what types of food do you typically eat? fruit, veggies and hummus   Do you like vegetables?  Yes   Do you drink water? Yes   How many glasses of juice do you drink in a typical day? 0   How many of glasses of milk do you drink in a typical day? 0   If you do drink milk, what type? N/A   How many 8oz glasses of sugar containing drinks such as Billy-Aid/sweet tea do you drink in a day? 0   How many cans/bottles of sugar pop/soda/tea/sports drinks do you drink in a day? 0   How many " cans/bottles of diet pop/soda/tea or sports drink do you drink in a day? 0   How often do you have a drink of alcohol? 2-4 Times a Month   If you do drink, how many drinks might you have in a day? 1 or 2       Eating Habits 2/27/2023   Generally, my meals include foods like these: bread, pasta, rice, potatoes, corn, crackers, sweet dessert, pop, or juice. Once a Week   Generally, my meals include foods like these: fried meats, brats, burgers, french fries, pizza, cheese, chips, or ice cream. Once a Week   Eat fast food (like McDonalds, [employer], Taco Bell). Less Than Weekly   Eat at a buffet or sit-down restaurant. Once a Week   Eat most of my meals in front of the TV or computer. Never   Often skip meals, eat at random times, have no regular eating times. A Few Times a Week   Rarely sit down for a meal but snack or graze throughout.  A Few Times a Week   Eat extra snacks between meals. Once a Week   Eat most of my food at the end of the day. Once a Week   Eat in the middle of the night or wake up at night to eat. Never   Eat extra snacks to prevent or correct low blood sugar. Never   Eat to prevent acid reflux or stomach pain. Never   Worry about not having enough food to eat. Never   Have you been to the food shelf at least a few times this year? No   I eat when I am depressed. Never   I eat when I am stressed. Never   I eat when I am bored. Once a Week   I eat when I am anxious. Never   I eat when I am happy or as a reward. Never   I feel hungry all the time even if I just have eaten. Never   Feeling full is important to me. Never   I finish all the food on my plate even if I am already full. Never   I can't resist eating delicious food or walk past the good food/smell. Never   I eat/snack without noticing that I am eating. Never   I eat when I am preparing the meal. Less Than Weekly   I eat more than usual when I see others eating. Never   I have trouble not eating sweets, ice cream, cookies, or chips if they  are around the house. Less Than Weekly   I think about food all day. Never   What foods, if any, do you crave? Chips/Crackers       Amount of Food 2/27/2023   I make myself vomit what I have eaten or use laxatives to get rid of food. Never   I eat a large amount of food, like a loaf of bread, a box of cookies, a pint/quart of ice cream, all at once. Never   I eat a large amount of food even when I am not hungry. Never   I eat rapidly. Never   I eat alone because I feel embarrassed and do not want others to see how much I have eaten. Never   I eat until I am uncomfortably full. Never   I feel bad, disgusted, or guilty after I overeat. Never   I make myself vomit what I have eaten or use laxatives to get rid of food. Never       Activity/Exercise History 2/27/2023   How much of a typical 12 hour day do you spend sitting? Less Than Half the Day   How much of a typical 12 hour day do you spend lying down? Less Than Half the Day   How much of a typical day do you spend walking/standing? Half the Day   How many hours (not including work) do you spend on the TV/Video Games/Computer/Tablet/Phone? 1 Hour or Less   How many times a week are you active for the purpose of exercise? 4-5 TImes a Week   How many total minutes do you spend doing some activity for the purpose of exercising when you exercise? More Than 30 Minutes       PAST MEDICAL HISTORY:  Past Medical History:   Diagnosis Date     Anal fissure      Conductive hearing loss 1/28/2014    after 1st ear surgery (think its conductive??)     Overweight      Vitamin D deficiency        Work/Social History Reviewed With Patient 2/27/2023   My employment status is: Full-Time   My job is:    How much of your job is spent on the computer or phone? 75%   How many hours do you spend commuting to work daily?  30   What is your marital status? /In a Relationship   If in a relationship, is your significant other overweight? Yes   Do you have children? Yes   If  "you have children, are they overweight? No   Who do you live with?  spouse and children   Are they supportive of your health goals? Yes   Who does the food shopping?  me       Mental Health History Reviewed With Patient 2/27/2023   Have you ever been physically or sexually abused? No   If yes, do you feel that the abuse is affecting your weight? N/A   If yes, would you like to talk to a counselor about the abuse? N/A   How often in the past 2 weeks have you felt little interest or pleasure in doing things? Not at all   Over the past 2 weeks how often have you felt down, depressed, or hopeless? Not at all       Sleep History Reviewed With Patient 2/27/2023   How many hours do you sleep at night? 8   Do you think that you snore loudly or has anybody ever heard you snore loudly (louder than talking or so loud it can be heard behind a shut door)? No   Has anyone seen or heard you stop breathing during your sleep? No   Do you often feel tired, fatigued, or sleepy during the day? No   Do you have a TV/Computer in your bedroom? Yes       MEDICATIONS:   No current outpatient medications on file.       ALLERGIES:   Allergies   Allergen Reactions     Penicillins Hives     Amoxicillin Itching and Rash     Ampicillin Rash       PHYSICAL EXAM:  /79 (BP Location: Left arm, Patient Position: Left side, Cuff Size: Adult Regular)   Pulse 75   Ht 1.6 m (5' 3\")   Wt 69.9 kg (154 lb)   SpO2 98%   BMI 27.28 kg/m      Waist circumference:      Wt Readings from Last 4 Encounters:   02/27/23 69.9 kg (154 lb)   11/03/22 70.8 kg (156 lb)   06/27/22 70.8 kg (156 lb)   12/09/21 67.5 kg (148 lb 12.8 oz)     A & O x 3  HEENT: NCAT, mucous membranes moist  Respirations unlabored      FOLLOW-UP:   4 weeks.    TIME: 45 min spent on evaluation, management, counseling, education, & motivational interviewing with greater than 50 % of the total time was spent on counseling and coordinating care    Sincerely,    TAJ DALE MD      "

## 2023-02-27 NOTE — ASSESSMENT & PLAN NOTE
We had a good conversation today as it relates to weight management.  The patient's BMI is 27 and she is not a candidate for weight loss medication.  I reviewed the patient's approach to nutrition as well as exercise and she is actually doing a great job with both of those.  I reviewed the importance of a whole food versus processed food approach to nutrition and provided her with macronutrient recommendations.  I recommended logging her food intake over the next month and then following up and at that time we can look for more opportunities.  However, we also discussed that having a good relationship with food, overall feeling good about her body, and focusing on healthy nutrition and regular exercise as well as getting adequate sleep and stress management are the most important thing she can do for her health going forward.

## 2023-04-16 ENCOUNTER — HEALTH MAINTENANCE LETTER (OUTPATIENT)
Age: 48
End: 2023-04-16

## 2023-05-22 ENCOUNTER — OFFICE VISIT (OUTPATIENT)
Dept: OTOLARYNGOLOGY | Facility: CLINIC | Age: 48
End: 2023-05-22
Payer: COMMERCIAL

## 2023-05-22 VITALS
DIASTOLIC BLOOD PRESSURE: 78 MMHG | BODY MASS INDEX: 27.64 KG/M2 | HEART RATE: 60 BPM | WEIGHT: 156 LBS | TEMPERATURE: 97.3 F | HEIGHT: 63 IN | SYSTOLIC BLOOD PRESSURE: 136 MMHG | OXYGEN SATURATION: 100 %

## 2023-05-22 DIAGNOSIS — H90.11 CONDUCTIVE HEARING LOSS OF RIGHT EAR WITH UNRESTRICTED HEARING OF LEFT EAR: Primary | ICD-10-CM

## 2023-05-22 PROCEDURE — 99212 OFFICE O/P EST SF 10 MIN: CPT | Mod: 25 | Performed by: OTOLARYNGOLOGY

## 2023-05-22 PROCEDURE — 92504 EAR MICROSCOPY EXAMINATION: CPT | Mod: GC | Performed by: OTOLARYNGOLOGY

## 2023-05-22 ASSESSMENT — PAIN SCALES - GENERAL: PAINLEVEL: NO PAIN (0)

## 2023-05-22 NOTE — PATIENT INSTRUCTIONS
You were seen in the ENT Clinic today by Dr. Gonsalez. If you have any questions or concerns after your appointment, please contact us (see below)      2.   Please return to clinic in one year.        How to Contact Us:  Send a [employer] message to your provider. Our team will respond to you via [employer]. Occasionally, we will need to call you to get further information.  For urgent matters (Monday-Friday), call the ENT Clinic: [phone] and speak with a call center team member - they will route your call appropriately.   If you'd like to speak directly with a nurse, please find our contact information below. We do our best to check voicemail frequently throughout the day, and will work to call you back within 1-2 days. For urgent matters, please use the general clinic phone numbers listed above.      Lisandra VERAS RN  ENT RN Care Coordinator  Direct: [phone]    Bharti ONEILL LPN  Direct: [phone]

## 2023-05-22 NOTE — PROGRESS NOTES
Otolaryngology Clinic      Name: Aicha Batista  MRN: [id number]  Age: 47 year old  : 1975      Chief Complaint:   Follow up    History of Present Illness:   Aicha Batista is a 47 year old female with a history of right canal wall up mastoidectomy in  followed by radical mastoidectomy by Dr. Torres at [facility] in 2017 for cholesteatoma who presents for follow up. She has a history of a prolonged mastoid cavity infection which resolved with frequent debridement and antibiotic therapy. At our last visit on 11/3/2022, she had an area of mucosalization which was cultured, and I started her on sulfacetamide-prednisolone drops, Bactrim, fluconazole, and clotrimazole drops as she grew both staph lugdunensis and candida.    Today, she reports that she has been doing well without any changes. Denies otalgia, otorrhea, or hearing changes.     Physical examination:  Female in no acute distress.  Alert and answering questions appropriately.  HB 1/6 bilaterally.  Bilateral ears examined under the microscope.  - Right: Good size meatus. Minimal amount of dry crusts in posterior cavity, debrided with alligator forceps. Well epithelialized mastoid cavity. Merrick-TM intact with minimal middle ear space. No retraction or debris collection.   - Left: Ear canal with minimal layer of crusts, debrided with curette. TM clear and middle ear aerated.      Assessment and Plan:  Aicha Batista is a 47 year old female with a history of right canal wall up mastoidectomy in  followed by radical mastoidectomy by Dr. Torres at [facility] in 2017 for cholesteatoma who presents for follow up. Right ear showed stable exam with resolved mucosalization and infection. We will see her back in 1 year for right ear check.     Follow-up: in 1 year for right ear check    Milly Lara MD MPH   Fellow Physician  Otology & Neurotology  [facility]     Scribe Preparation Attestation:  Angel ARAGON a  tae, prepared the chart for today's encounter.    The documentation recorded by the scribe accurately reflects the services I personally performed and the decisions made by me.    I, Ana Gonsalez MD, saw this patient with the resident/fellow and agree with the resident/fellow s findings and plan of care as documented in the resident s/fellow s note. I was present for the entire procedure.    Ana Gonsalez MD

## 2023-05-22 NOTE — NURSING NOTE
"Chief Complaint   Patient presents with     RECHECK     Follow up      Blood pressure 136/78, pulse 60, temperature 97.3  F (36.3  C), height 1.6 m (5' 3\"), weight 70.8 kg (156 lb), SpO2 100 %, not currently breastfeeding.    Rasheed Pollock LPN  v  "

## 2023-05-22 NOTE — LETTER
2023      [facility]ista  [address] [facility] [address]         Otolaryngology Clinic      Name: Aciha Batista  MRN: [id number]  Age: 47 year old  : 1975      Chief Complaint:   Follow up    History of Present Illness:   Aicha Batista is a 47 year old female with a history of right canal wall up mastoidectomy in  followed by radical mastoidectomy by Dr. Torres at [facility] in 2017 for cholesteatoma who presents for follow up. She has a history of a prolonged mastoid cavity infection which resolved with frequent debridement and antibiotic therapy. At our last visit on 11/3/2022, she had an area of mucosalization which was cultured, and I started her on sulfacetamide-prednisolone drops, Bactrim, fluconazole, and clotrimazole drops as she grew both staph lugdunensis and candida.    Today, she reports that she has been doing well without any changes. Denies otalgia, otorrhea, or hearing changes.     Physical examination:  Female in no acute distress.  Alert and answering questions appropriately.  HB 1/6 bilaterally.  Bilateral ears examined under the microscope.  - Right: Good size meatus. Minimal amount of dry crusts in posterior cavity, debrided with alligator forceps. Well epithelialized mastoid cavity. Merrick-TM intact with minimal middle ear space. No retraction or debris collection.   - Left: Ear canal with minimal layer of crusts, debrided with curette. TM clear and middle ear aerated.      Assessment and Plan:  Aicha Batista is a 47 year old female with a history of right canal wall up mastoidectomy in  followed by radical mastoidectomy by Dr. Torres at [facility] in 2017 for cholesteatoma who presents for follow up. Right ear showed stable exam with resolved mucosalization and infection. We will see her back in 1 year for right ear check.     Follow-up: in 1 year for right ear check    Milly Lara MD MPH   Fellow Physician  Otology & Neurotology  [place]  [facility]     Scribe Preparation Attestation:  I, Angel Deleon, a scribe, prepared the chart for today's encounter.    The documentation recorded by the scribe accurately reflects the services I personally performed and the decisions made by me.    I, Ana Gonsalez MD, saw this patient with the resident/fellow and agree with the resident/fellow s findings and plan of care as documented in the resident s/fellow s note. I was present for the entire procedure.    Ana Gonsalez MD

## 2023-08-10 ENCOUNTER — LAB REQUISITION (OUTPATIENT)
Dept: LAB | Facility: CLINIC | Age: 48
End: 2023-08-10
Payer: COMMERCIAL

## 2023-08-10 DIAGNOSIS — Z13.220 ENCOUNTER FOR SCREENING FOR LIPOID DISORDERS: ICD-10-CM

## 2023-08-10 DIAGNOSIS — Z13.1 ENCOUNTER FOR SCREENING FOR DIABETES MELLITUS: ICD-10-CM

## 2023-08-10 DIAGNOSIS — Z78.0 ASYMPTOMATIC MENOPAUSAL STATE: ICD-10-CM

## 2023-08-10 LAB
CHOLEST SERPL-MCNC: 211 MG/DL
HBA1C MFR BLD: 4.9 %
HDLC SERPL-MCNC: 90 MG/DL
LDLC SERPL CALC-MCNC: 113 MG/DL
NONHDLC SERPL-MCNC: 121 MG/DL
TRIGL SERPL-MCNC: 42 MG/DL
TSH SERPL DL<=0.005 MIU/L-ACNC: 2.74 UIU/ML (ref 0.3–4.2)

## 2023-08-10 PROCEDURE — 84443 ASSAY THYROID STIM HORMONE: CPT | Mod: ORL | Performed by: OBSTETRICS & GYNECOLOGY

## 2023-08-10 PROCEDURE — 80061 LIPID PANEL: CPT | Mod: ORL | Performed by: OBSTETRICS & GYNECOLOGY

## 2023-08-10 PROCEDURE — 83036 HEMOGLOBIN GLYCOSYLATED A1C: CPT | Mod: ORL | Performed by: OBSTETRICS & GYNECOLOGY

## 2023-12-31 NOTE — PROGRESS NOTES
Chief Complaint   Patient presents with     RECHECK     r/s per f.u     Blood pressure 137/80, pulse 56, temperature 98.3  F (36.8  C), temperature source Temporal, weight 71.7 kg (158 lb), SpO2 100 %.    Bharti Tipton LPN     After discharge, please stay on pelvic rest for 6 weeks, meaning no sexual intercourse, no tampons and no douching.  No driving for 2 weeks.  No lifting objects heavier than baby for 2 weeks.  Expect to have vaginal bleeding/spotting for up to six weeks.  The bleeding should get lighter and more white/light brown with time.  For bleeding soaking more than a pad an hour or passing clots greater than the size of your fist, come in to the   emergency department.  Follow up in the office in 6 weeks

## 2024-03-19 ENCOUNTER — TELEPHONE (OUTPATIENT)
Dept: OTOLARYNGOLOGY | Facility: CLINIC | Age: 49
End: 2024-03-19
Payer: COMMERCIAL

## 2024-06-23 ENCOUNTER — HEALTH MAINTENANCE LETTER (OUTPATIENT)
Age: 49
End: 2024-06-23

## 2024-07-30 NOTE — PROGRESS NOTES
"  [facility] EAR NOSE AND THROAT CLINIC [address] [address]  Phone: [phone]  Fax: [phone]    Patient:  Aicha Batista, Date of birth 1975  Date of Visit:  08/07/2024  Referring Provider Referred Self      Assessment & Plan      Aicha Batista is a 48 year old female being seen for an annual follow-up. Physical exam shown that her right ear is stable with no signs of infection or inflammation. She will return in 1-year for a routine ear check with one of our APPs.     HPI  Aicha Batista is a 48 year old female being seen for an annual follow-up. She has a history of a right canal wall up mastoidectomy in 2014 followed by radical mastoidectomy by Dr. Torres at [facility] in 9/2017 for cholesteatoma; and a prolonged mastoid cavity infection which resolved with frequent debridement and antibiotic therapy. Her previous visit was on 05/22/2023, where her right ear was stable with resolved mucosalization and infection from a prior staph lugdunensis and candida infection.     Today, she is happy to report no drainage, with very little crusting and occasional debris that just falls out. She has plans to go to [facility] at the end of this month with her kids.     /67 (BP Location: Right arm, Patient Position: Sitting, Cuff Size: Adult Regular)   Pulse 82   Ht 1.6 m (5' 3\")   Wt 62.1 kg (137 lb)   BMI 24.27 kg/m     Physical examination:  female in no acute distress.  Alert and answering questions appropriately.  HB 1/6 bilaterally.  Right ear was examined under the microscope. The right mastoid cavity is fairly clean with a thin layer of cerumen lining the entire posterior portion of the cavity. The posterior cavity is quite large and does go underneath her posterior meatus, and so it does require getting a suction underneath the meatus to clean. The tympanic membrane is intact and the middle ear is aerated.     Scribe Disclosure:   I, " Dominique Mc, am serving as a scribe; to document services personally performed by Ana Gonsalez MD -based on data collection and the provider's statements to me.     Provider Disclosure:  I agree with above History, Review of Systems, Physical exam and Plan.  I have reviewed the content of the documentation and have edited it as needed. I have personally performed the services documented here and the documentation accurately represents those services and the decisions I have made.

## 2024-08-07 ENCOUNTER — OFFICE VISIT (OUTPATIENT)
Dept: OTOLARYNGOLOGY | Facility: CLINIC | Age: 49
End: 2024-08-07
Payer: COMMERCIAL

## 2024-08-07 ENCOUNTER — TELEPHONE (OUTPATIENT)
Dept: OTOLARYNGOLOGY | Facility: CLINIC | Age: 49
End: 2024-08-07

## 2024-08-07 VITALS
DIASTOLIC BLOOD PRESSURE: 67 MMHG | WEIGHT: 137 LBS | BODY MASS INDEX: 24.27 KG/M2 | HEART RATE: 82 BPM | HEIGHT: 63 IN | SYSTOLIC BLOOD PRESSURE: 101 MMHG

## 2024-08-07 DIAGNOSIS — H90.11 CONDUCTIVE HEARING LOSS OF RIGHT EAR WITH UNRESTRICTED HEARING OF LEFT EAR: Primary | ICD-10-CM

## 2024-08-07 PROCEDURE — 69220 CLEAN OUT MASTOID CAVITY: CPT | Mod: RT | Performed by: OTOLARYNGOLOGY

## 2024-08-07 PROCEDURE — 99212 OFFICE O/P EST SF 10 MIN: CPT | Mod: 25 | Performed by: OTOLARYNGOLOGY

## 2024-08-07 RX ORDER — PROGESTERONE 100 MG/1
CAPSULE ORAL
COMMUNITY

## 2024-08-07 NOTE — PATIENT INSTRUCTIONS
You were seen in the ENT Clinic today by Dr. Gonsalez. If you have any questions or concerns after your appointment, please contact us (see below)    Please return to clinic in 1 year with Karyn or Maile    How to Contact Us:  Send a [employer] message to your provider. Our team will respond to you via [employer]. Occasionally, we will need to call you to get further information.  For urgent matters (Monday-Friday), call the ENT Clinic: [phone] and speak with a call center team member - they will route your call appropriately.   If you'd like to speak directly with a nurse, please find our contact information below. We do our best to check voicemail frequently throughout the day, and will work to call you back within 1-2 days. For urgent matters, please use the general clinic phone numbers listed above.    Lisandra VERAS, RN, BSN  RN Care Coordinator, ENT Clinic  [facility]  Direct: [phone]  Bharti MADERA LPN  Direct: [phone]

## 2024-08-07 NOTE — Clinical Note
"8/7/2024       RE: Aicha Batista  [address] [facility] [address]     Dear Colleague,    Thank you for referring your patient, Aicha Batista, to the [facility] EAR NOSE AND THROAT CLINIC [place] at [facility]. Please see a copy of my visit note below.      [facility] EAR NOSE AND THROAT CLIN[facility] [place]  [address]  [address]TH FLO[address]  [facility] [address]  Phone: [phone]  Fax: [phone]    Patient:  Aicha Batista, Date of birth 1975  Date of Visit:  08/07/2024  Referring Provider Referred Self      Assessment & Plan     Aicha Batista is a 48 year old female being seen for an annual follow-up. Physical exam shown that her right ear is stable with no signs of infection or inflammation. She will return in 1-year for a routine ear check.     HPI  Aicha Batista is a 48 year old female being seen for an annual follow-up. She has a history of a right canal wall up mastoidectomy in 2014 followed by radical mastoidectomy by Dr. Torres at [facility] in 9/2017 for cholesteatoma; and a prolonged mastoid cavity infection which resolved with frequent debridement and antibiotic therapy. Her previous visit was on 05/22/2023, where her right ear was stable with resolved mucosalization and infection from a prior staph lugdunensis and candida infection. Today, she is happy to report no drainage, with very little crusting and occasional debris that just falls out upon waking. She has plans to go to [place] at the end of this month.     /67 (BP Location: Right arm, Patient Position: Sitting, Cuff Size: Adult Regular)   Pulse 82   Ht 1.6 m (5' 3\")   Wt 62.1 kg (137 lb)   BMI 24.27 kg/m     Physical examination:  female in no acute distress.  Alert and answering questions appropriately.  HB 1/6 bilaterally.  Right ear was examined under the microscope. The right mastoid cavity is fairly clean with a thin layer of cerumen " lining the entire posterior portion of the cavity. The posterior cavity is quite large and does go underneath her posterior meatus, and so it does require getting a suction underneath the meatus to clean. The tympanic membrane is intact and the middle ear is aerated.     Scribe Disclosure:   I, Dominiquecesario Mc, am serving as a scribe; to document services personally performed by Ana Gonsalez MD -based on data collection and the provider's statements to me.     Provider Disclosure:  I agree with above History, Review of Systems, Physical exam and Plan.  I have reviewed the content of the documentation and have edited it as needed. I have personally performed the services documented here and the documentation accurately represents those services and the decisions I have made.      Electronically signed by:  ***        [facility] EAR NOSE AND THROAT CLINIC [address] [address]  Phone: [phone]  Fax: [phone]    Patient:  Aicha Batista, Date of birth 1975  Date of Visit:  08/07/2024  Referring Provider Referred Self      Assessment & Plan     Aicha Batista is a 48 year old female being seen for an annual follow-up. Physical exam shown that her right ear is stable with no signs of infection or inflammation. She will return in 1-year for a routine ear check with one of our APPs.     HPI  Aicha Batista is a 48 year old female being seen for an annual follow-up. She has a history of a right canal wall up mastoidectomy in 2014 followed by radical mastoidectomy by Dr. Torres at [place] in 9/2017 for cholesteatoma; and a prolonged mastoid cavity infection which resolved with frequent debridement and antibiotic therapy. Her previous visit was on 05/22/2023, where her right ear was stable with resolved mucosalization and infection from a prior staph lugdunensis and candida infection.     Today, she is happy to report no drainage, with very  "little crusting and occasional debris that just falls out. She has plans to go to [facility] at the end of this month with her kids.     /67 (BP Location: Right arm, Patient Position: Sitting, Cuff Size: Adult Regular)   Pulse 82   Ht 1.6 m (5' 3\")   Wt 62.1 kg (137 lb)   BMI 24.27 kg/m     Physical examination:  female in no acute distress.  Alert and answering questions appropriately.  HB 1/6 bilaterally.  Right ear was examined under the microscope. The right mastoid cavity is fairly clean with a thin layer of cerumen lining the entire posterior portion of the cavity. The posterior cavity is quite large and does go underneath her posterior meatus, and so it does require getting a suction underneath the meatus to clean. The tympanic membrane is intact and the middle ear is aerated.     Scribe Disclosure:   I, Dominique Mc, am serving as a scribe; to document services personally performed by Ana Gonsalez MD -based on data collection and the provider's statements to me.     Provider Disclosure:  I agree with above History, Review of Systems, Physical exam and Plan.  I have reviewed the content of the documentation and have edited it as needed. I have personally performed the services documented here and the documentation accurately represents those services and the decisions I have made.        Again, thank you for allowing me to participate in the care of your patient.      Sincerely,    Ana Gonsalez MD    "

## 2024-08-07 NOTE — TELEPHONE ENCOUNTER
Left Voicemail (1st Attempt) for the patient to call back and schedule the following:    Appointment type: Return ENT  Provider: Dr. Gonsalez  Return date: August of 2025  Specialty phone number: [phone]  Additional appointment(s) needed: No  Additonal Notes: No

## 2024-11-10 ENCOUNTER — HEALTH MAINTENANCE LETTER (OUTPATIENT)
Age: 49
End: 2024-11-10

## 2025-05-12 ENCOUNTER — LAB REQUISITION (OUTPATIENT)
Dept: LAB | Facility: CLINIC | Age: 50
End: 2025-05-12
Payer: COMMERCIAL

## 2025-05-12 DIAGNOSIS — Z12.4 ENCOUNTER FOR SCREENING FOR MALIGNANT NEOPLASM OF CERVIX: ICD-10-CM

## 2025-05-12 PROCEDURE — G0145 SCR C/V CYTO,THINLAYER,RESCR: HCPCS | Performed by: OBSTETRICS & GYNECOLOGY

## 2025-05-12 PROCEDURE — 87624 HPV HI-RISK TYP POOLED RSLT: CPT | Performed by: OBSTETRICS & GYNECOLOGY

## 2025-05-12 PROCEDURE — G0145 SCR C/V CYTO,THINLAYER,RESCR: HCPCS | Mod: ORL | Performed by: OBSTETRICS & GYNECOLOGY

## 2025-05-12 PROCEDURE — 87624 HPV HI-RISK TYP POOLED RSLT: CPT | Mod: ORL | Performed by: OBSTETRICS & GYNECOLOGY

## 2025-05-13 LAB
HPV HR 12 DNA CVX QL NAA+PROBE: NEGATIVE
HPV16 DNA CVX QL NAA+PROBE: NEGATIVE
HPV18 DNA CVX QL NAA+PROBE: NEGATIVE
HUMAN PAPILLOMA VIRUS FINAL DIAGNOSIS: NORMAL

## 2025-05-15 LAB
BKR AP ASSOCIATED HPV REPORT: NORMAL
BKR LAB AP GYN ADEQUACY: NORMAL
BKR LAB AP GYN INTERPRETATION: NORMAL
BKR LAB AP LMP: NORMAL
BKR LAB AP PREVIOUS ABNL DX: NORMAL
BKR LAB AP PREVIOUS ABNORMAL: NORMAL
PATH REPORT.COMMENTS IMP SPEC: NORMAL
PATH REPORT.COMMENTS IMP SPEC: NORMAL
PATH REPORT.RELEVANT HX SPEC: NORMAL

## 2025-08-07 DIAGNOSIS — H90.11 CONDUCTIVE HEARING LOSS OF RIGHT EAR WITH UNRESTRICTED HEARING OF LEFT EAR: Primary | ICD-10-CM

## 2025-08-11 ENCOUNTER — PATIENT OUTREACH (OUTPATIENT)
Dept: CARE COORDINATION | Facility: CLINIC | Age: 50
End: 2025-08-11
Payer: COMMERCIAL